# Patient Record
Sex: MALE | Race: WHITE | Employment: OTHER | URBAN - NONMETROPOLITAN AREA
[De-identification: names, ages, dates, MRNs, and addresses within clinical notes are randomized per-mention and may not be internally consistent; named-entity substitution may affect disease eponyms.]

---

## 2017-02-28 ENCOUNTER — OFFICE VISIT (OUTPATIENT)
Dept: CARDIOLOGY CLINIC | Age: 62
End: 2017-02-28

## 2017-02-28 VITALS
BODY MASS INDEX: 36.98 KG/M2 | SYSTOLIC BLOOD PRESSURE: 124 MMHG | WEIGHT: 244 LBS | HEIGHT: 68 IN | HEART RATE: 72 BPM | DIASTOLIC BLOOD PRESSURE: 60 MMHG

## 2017-02-28 DIAGNOSIS — I10 ESSENTIAL HYPERTENSION, BENIGN: ICD-10-CM

## 2017-02-28 DIAGNOSIS — I25.10 ATHEROSCLEROSIS OF NATIVE CORONARY ARTERY OF NATIVE HEART WITHOUT ANGINA PECTORIS: ICD-10-CM

## 2017-02-28 DIAGNOSIS — E78.5 HYPERLIPIDEMIA, UNSPECIFIED HYPERLIPIDEMIA TYPE: Primary | ICD-10-CM

## 2017-02-28 PROCEDURE — G8598 ASA/ANTIPLAT THER USED: HCPCS | Performed by: INTERNAL MEDICINE

## 2017-02-28 PROCEDURE — G8427 DOCREV CUR MEDS BY ELIG CLIN: HCPCS | Performed by: INTERNAL MEDICINE

## 2017-02-28 PROCEDURE — G8417 CALC BMI ABV UP PARAM F/U: HCPCS | Performed by: INTERNAL MEDICINE

## 2017-02-28 PROCEDURE — 1036F TOBACCO NON-USER: CPT | Performed by: INTERNAL MEDICINE

## 2017-02-28 PROCEDURE — 99214 OFFICE O/P EST MOD 30 MIN: CPT | Performed by: INTERNAL MEDICINE

## 2017-02-28 PROCEDURE — 3017F COLORECTAL CA SCREEN DOC REV: CPT | Performed by: INTERNAL MEDICINE

## 2017-02-28 PROCEDURE — G8484 FLU IMMUNIZE NO ADMIN: HCPCS | Performed by: INTERNAL MEDICINE

## 2017-02-28 RX ORDER — IRBESARTAN AND HYDROCHLOROTHIAZIDE 300; 12.5 MG/1; MG/1
1 TABLET, FILM COATED ORAL DAILY
Qty: 30 TABLET | Refills: 11 | Status: SHIPPED | OUTPATIENT
Start: 2017-02-28 | End: 2017-08-28 | Stop reason: SDUPTHER

## 2017-02-28 RX ORDER — AMLODIPINE BESYLATE 10 MG/1
10 TABLET ORAL DAILY
Qty: 30 TABLET | Refills: 11 | Status: SHIPPED | OUTPATIENT
Start: 2017-02-28 | End: 2017-08-28 | Stop reason: SDUPTHER

## 2017-05-03 ENCOUNTER — TELEPHONE (OUTPATIENT)
Dept: CARDIOLOGY CLINIC | Age: 62
End: 2017-05-03

## 2017-08-28 ENCOUNTER — OFFICE VISIT (OUTPATIENT)
Dept: CARDIOLOGY CLINIC | Age: 62
End: 2017-08-28

## 2017-08-28 VITALS
HEIGHT: 68 IN | HEART RATE: 61 BPM | DIASTOLIC BLOOD PRESSURE: 76 MMHG | WEIGHT: 243 LBS | BODY MASS INDEX: 36.83 KG/M2 | SYSTOLIC BLOOD PRESSURE: 139 MMHG

## 2017-08-28 DIAGNOSIS — N28.9 RENAL INSUFFICIENCY: ICD-10-CM

## 2017-08-28 DIAGNOSIS — E78.5 HYPERLIPIDEMIA, UNSPECIFIED HYPERLIPIDEMIA TYPE: ICD-10-CM

## 2017-08-28 DIAGNOSIS — I25.10 ATHEROSCLEROSIS OF NATIVE CORONARY ARTERY OF NATIVE HEART WITHOUT ANGINA PECTORIS: ICD-10-CM

## 2017-08-28 DIAGNOSIS — E11.9 TYPE 2 DIABETES MELLITUS WITHOUT COMPLICATION, UNSPECIFIED LONG TERM INSULIN USE STATUS: ICD-10-CM

## 2017-08-28 DIAGNOSIS — I10 ESSENTIAL HYPERTENSION, BENIGN: ICD-10-CM

## 2017-08-28 DIAGNOSIS — I25.10 CORONARY ARTERY DISEASE INVOLVING NATIVE HEART WITHOUT ANGINA PECTORIS, UNSPECIFIED VESSEL OR LESION TYPE: Primary | ICD-10-CM

## 2017-08-28 PROCEDURE — 99213 OFFICE O/P EST LOW 20 MIN: CPT | Performed by: INTERNAL MEDICINE

## 2017-08-28 PROCEDURE — 1036F TOBACCO NON-USER: CPT | Performed by: INTERNAL MEDICINE

## 2017-08-28 PROCEDURE — 3046F HEMOGLOBIN A1C LEVEL >9.0%: CPT | Performed by: INTERNAL MEDICINE

## 2017-08-28 PROCEDURE — 3017F COLORECTAL CA SCREEN DOC REV: CPT | Performed by: INTERNAL MEDICINE

## 2017-08-28 PROCEDURE — G8427 DOCREV CUR MEDS BY ELIG CLIN: HCPCS | Performed by: INTERNAL MEDICINE

## 2017-08-28 PROCEDURE — G8598 ASA/ANTIPLAT THER USED: HCPCS | Performed by: INTERNAL MEDICINE

## 2017-08-28 PROCEDURE — G8417 CALC BMI ABV UP PARAM F/U: HCPCS | Performed by: INTERNAL MEDICINE

## 2017-08-28 RX ORDER — NITROGLYCERIN 0.4 MG/1
0.4 TABLET SUBLINGUAL EVERY 5 MIN PRN
Qty: 25 TABLET | Refills: 1 | Status: SHIPPED | OUTPATIENT
Start: 2017-08-28 | End: 2018-09-06 | Stop reason: SDUPTHER

## 2017-08-28 RX ORDER — IRBESARTAN AND HYDROCHLOROTHIAZIDE 300; 12.5 MG/1; MG/1
1 TABLET, FILM COATED ORAL DAILY
Qty: 30 TABLET | Refills: 11 | Status: SHIPPED | OUTPATIENT
Start: 2017-08-28 | End: 2018-08-31 | Stop reason: SDUPTHER

## 2017-08-28 RX ORDER — AMLODIPINE BESYLATE 10 MG/1
10 TABLET ORAL DAILY
Qty: 30 TABLET | Refills: 11 | Status: SHIPPED | OUTPATIENT
Start: 2017-08-28 | End: 2018-08-31 | Stop reason: SDUPTHER

## 2017-09-07 ENCOUNTER — HOSPITAL ENCOUNTER (OUTPATIENT)
Dept: NON INVASIVE DIAGNOSTICS | Age: 62
Discharge: HOME | End: 2017-09-07
Attending: INTERNAL MEDICINE | Admitting: INTERNAL MEDICINE

## 2017-09-07 DIAGNOSIS — I25.10 ATHEROSCLEROTIC HEART DISEASE OF NATIVE CORONARY ARTERY WITHOUT ANGINA PECTORIS: ICD-10-CM

## 2018-08-14 ENCOUNTER — TELEPHONE (OUTPATIENT)
Dept: CARDIOLOGY CLINIC | Age: 63
End: 2018-08-14

## 2018-08-14 DIAGNOSIS — N18.9 CHRONIC KIDNEY DISEASE, UNSPECIFIED CKD STAGE: Primary | ICD-10-CM

## 2018-08-14 DIAGNOSIS — E11.8 TYPE 2 DIABETES MELLITUS WITH COMPLICATION, WITHOUT LONG-TERM CURRENT USE OF INSULIN (HCC): ICD-10-CM

## 2018-08-14 NOTE — TELEPHONE ENCOUNTER
This patient just called called Dr Amador Adams office to make an appt . He was supposed to call them after his appt 8/28/17 and come back to see LES in 2/2018 . Does [facility]ES still want this patient to see Dr Rosa Isela Hernandez? They need an updated referral if he does want him to see Dr Rosa Isela Hernandez .  Please call Dennie Haus at Dr Amador Adams office to let her know if [facility] wants to see the patient first or if he is going to fax a referral . Fax [phone]

## 2018-08-14 NOTE — TELEPHONE ENCOUNTER
Referral placed in [facility] and faxed to Dr. Esqueda Mar office. Also called Madison Valerio to let her know referral was sent and patient can be seen by Dr. Kelly Elena before being seen by Dr. Candelaria Rodriguez.

## 2018-08-31 RX ORDER — AMLODIPINE BESYLATE 10 MG/1
TABLET ORAL
Qty: 30 TABLET | Refills: 0 | Status: SHIPPED | OUTPATIENT
Start: 2018-08-31 | End: 2018-09-10 | Stop reason: SDUPTHER

## 2018-08-31 RX ORDER — IRBESARTAN AND HYDROCHLOROTHIAZIDE 300; 12.5 MG/1; MG/1
TABLET, FILM COATED ORAL
Qty: 30 TABLET | Refills: 0 | Status: SHIPPED | OUTPATIENT
Start: 2018-08-31 | End: 2018-09-12 | Stop reason: SDUPTHER

## 2018-09-06 ENCOUNTER — OFFICE VISIT (OUTPATIENT)
Dept: CARDIOLOGY CLINIC | Age: 63
End: 2018-09-06

## 2018-09-06 VITALS
DIASTOLIC BLOOD PRESSURE: 72 MMHG | HEIGHT: 69 IN | SYSTOLIC BLOOD PRESSURE: 116 MMHG | HEART RATE: 64 BPM | BODY MASS INDEX: 36.05 KG/M2 | WEIGHT: 243.38 LBS

## 2018-09-06 DIAGNOSIS — E78.5 HYPERLIPIDEMIA, UNSPECIFIED HYPERLIPIDEMIA TYPE: ICD-10-CM

## 2018-09-06 DIAGNOSIS — I10 ESSENTIAL HYPERTENSION, BENIGN: ICD-10-CM

## 2018-09-06 DIAGNOSIS — I25.10 ATHEROSCLEROSIS OF NATIVE CORONARY ARTERY OF NATIVE HEART WITHOUT ANGINA PECTORIS: Primary | ICD-10-CM

## 2018-09-06 DIAGNOSIS — N28.9 RENAL INSUFFICIENCY: ICD-10-CM

## 2018-09-06 PROCEDURE — 3017F COLORECTAL CA SCREEN DOC REV: CPT | Performed by: INTERNAL MEDICINE

## 2018-09-06 PROCEDURE — G8598 ASA/ANTIPLAT THER USED: HCPCS | Performed by: INTERNAL MEDICINE

## 2018-09-06 PROCEDURE — G8417 CALC BMI ABV UP PARAM F/U: HCPCS | Performed by: INTERNAL MEDICINE

## 2018-09-06 PROCEDURE — 1036F TOBACCO NON-USER: CPT | Performed by: INTERNAL MEDICINE

## 2018-09-06 PROCEDURE — G8427 DOCREV CUR MEDS BY ELIG CLIN: HCPCS | Performed by: INTERNAL MEDICINE

## 2018-09-06 PROCEDURE — 99214 OFFICE O/P EST MOD 30 MIN: CPT | Performed by: INTERNAL MEDICINE

## 2018-09-06 RX ORDER — NITROGLYCERIN 0.4 MG/1
0.4 TABLET SUBLINGUAL EVERY 5 MIN PRN
Qty: 25 TABLET | Refills: 1 | Status: SHIPPED | OUTPATIENT
Start: 2018-09-06 | End: 2021-03-12 | Stop reason: SDUPTHER

## 2018-09-06 NOTE — COMMUNICATION BODY
[address]   Cardiac Follow Up    Referring Provider:  Christian Spencer MD     Chief Complaint   Patient presents with    6 Month Follow-Up     Pt states he has no complaints. History of Present Illness:  Mr. Joseph Odom is a 61y.o. year old gentleman here today for follow up. His history includes non-obstructive CAD (40-50% mid LAD lesion per cath 9/2009), hypertension, hyperlipidemia, and diabetes. He is active, does not smoke. Today, Daniel Franco tells me both he and his wife are fully retired and enjoying every bit of it. Has recently been to [place] with his horses. He denies exertional chest pain, SOB/STEWARD, PND, palpitations, light-headedness, or edema. Seeing Dr Chelita Alvarado for renal insufficiency      Past Medical History:   has a past medical history of Diabetes mellitus ([facility] [address] Hyperlipidemia; and Hypertension. Surgical History:   has no past surgical history on file. Social History:   reports that he has never smoked. He has never used smokeless tobacco. He reports that he drinks alcohol. He reports that he does not use drugs. Family History:  family history includes Heart Disease in his father. Current Outpatient Prescriptions   Medication Sig Dispense Refill    amLODIPine (NORVASC) 10 MG tablet TAKE ONE TABLET BY MOUTH EVERY DAY 30 tablet 0    irbesartan-hydrochlorothiazide (AVALIDE) 300-12.5 MG per tablet TAKE ONE TABLET BY MOUTH EVERY DAY 30 tablet 0    nitroGLYCERIN (NITROSTAT) 0.4 MG SL tablet Place 1 tablet under the tongue every 5 minutes as needed for Chest pain As directed for chest pain 25 tablet 1    empagliflozin (JARDIANCE) 25 MG tablet Take 25 mg by mouth daily      metformin (GLUCOPHAGE) 500 MG tablet Take 1 tablet by mouth 3 times daily. 90 tablet 6    atorvastatin (LIPITOR) 20 MG tablet Take 20 mg by mouth daily.  glyBURIDE (DIABETA) 5 MG tablet Take 5 mg by mouth 2 times daily.         ASA-APAP-Caff-Ca Gluc TABS Take 81 mg by mouth daily.  metoprolol (TOPROL XL) 100 MG XL tablet Take 100 mg by mouth daily.  ramipril (ALTACE) 10 MG tablet Take 10 mg by mouth 2 times daily. No current facility-administered medications for this visit. Allergies:  Fish oil     Review of Systems:   · Constitutional: there has been no unanticipated weight loss. There's been no change in energy level, sleep pattern, or activity level. · Eyes: No visual changes or diplopia. No scleral icterus. · ENT: No Headaches, hearing loss or vertigo. No mouth sores or sore throat. · Cardiovascular: Reviewed in HPI  · Respiratory: No cough or wheezing, no sputum production. No hematemesis. · Gastrointestinal: No abdominal pain, appetite loss, blood in stools. No change in bowel or bladder habits. · Genitourinary: No dysuria, trouble voiding, or hematuria. · Musculoskeletal:  No gait disturbance, weakness or joint complaints. · Integumentary: No rash or pruritis. · Neurological: No headache, diplopia, change in muscle strength, numbness or tingling. No change in gait, balance, coordination, mood, affect, memory, mentation, behavior. · Psychiatric: No anxiety, no depression. · Endocrine: No malaise, fatigue or temperature intolerance. No excessive thirst, fluid intake, or urination. No tremor. · Hematologic/Lymphatic: No abnormal bruising or bleeding, blood clots or swollen lymph nodes. · Allergic/Immunologic: No nasal congestion or hives.     Physical Examination:    Vitals:    09/06/18 0921   BP: 116/72   Pulse: 64        Constitutional and General Appearance: NAD, obese, pleasant  Skin:good turgor,intact without lesions  HEENT: EOMI ,normal  Neck:no JVD   Respiratory:  · Normal excursion and expansion without use of accessory muscles  · Resp Auscultation: Normal breath sounds without dullness  Cardiovascular:  · The apical impulses not displaced  · Heart tones are crisp and normal  · Cervical veins are not engorged  · The carotid upstroke is normal in amplitude and contour without delay or bruit  · Normal heart tones with no rub, murmur or gallops  · Peripheral pulses are symmetrical and full  · There is no clubbing, cyanosis of the extremities. · No edema  · Femoral Arteries: 2+ and equal  · Pedal Pulses: 2+ and equal   Abdomen:  · No masses or tenderness  · Liver/Spleen: No Abnormalities Noted  Neurological/Psychiatric:  · Alert and oriented in all spheres  · Moves all extremities well  · Exhibits normal gait balance and coordination  · No abnormalities of mood, affect, memory, mentation, or behavior are noted      Assessment/Plan:    1. Coronary artery Disease: Cath 9/2009:  40-50% mid LAD lesion. Stress test 8/2010 showed normal perfusion and EF. GXT Myoview 7/2012: normal with hypertensive exercise response     2. Diabetes mellitus: Followed by PCP   3. Essential hypertension, benign: /72 (Site: Left Arm, Position: Sitting, Cuff Size: Large Adult)   Pulse 64   Ht 5' 9\" (1.753 m)   Wt 243 lb 6 oz (110.4 kg)   BMI 35.94 kg/m²     4. Other and unspecified hyperlipidemia:  4/21/17  Bun 31 creat 1.9 tc 137 tri 156 hdl 35 ldl 71--stable   5. Altered kidney function - labs 5/16: creat 2.24.4/17  Creat 1.9  . Recently 2.0 and [facility]    Plan  1. Will have lipids, AST and ALT drawn  2. No med changes at this time  3. Call for any change in status  4. Follow up in 6 months- recommend gxt every 2-3 years      I appreciate the opportunity of cooperating in the care of this individual.    Ashutosh Iqbal M.D., [facility]    This note was scribed in the presence of Dr. Danielle Iqbal MD, by Felicia Pacheco RN. The scribe's documentation has been prepared under my direction and personally reviewed by me in its entirety. I confirm that the note above accurately reflects all work, treatment, procedures, and medical decision making performed by me.

## 2018-09-06 NOTE — LETTER
tongue every 5 minutes as needed for Chest pain As directed for chest pain 25 tablet 1    empagliflozin (JARDIANCE) 25 MG tablet Take 25 mg by mouth daily      metformin (GLUCOPHAGE) 500 MG tablet Take 1 tablet by mouth 3 times daily. 90 tablet 6    atorvastatin (LIPITOR) 20 MG tablet Take 20 mg by mouth daily.  glyBURIDE (DIABETA) 5 MG tablet Take 5 mg by mouth 2 times daily.  ASA-APAP-Caff-Ca Gluc TABS Take 81 mg by mouth daily.  metoprolol (TOPROL XL) 100 MG XL tablet Take 100 mg by mouth daily.  ramipril (ALTACE) 10 MG tablet Take 10 mg by mouth 2 times daily. No current facility-administered medications for this visit. Allergies:  Fish oil     Review of Systems:   · Constitutional: there has been no unanticipated weight loss. There's been no change in energy level, sleep pattern, or activity level. · Eyes: No visual changes or diplopia. No scleral icterus. · ENT: No Headaches, hearing loss or vertigo. No mouth sores or sore throat. · Cardiovascular: Reviewed in HPI  · Respiratory: No cough or wheezing, no sputum production. No hematemesis. · Gastrointestinal: No abdominal pain, appetite loss, blood in stools. No change in bowel or bladder habits. · Genitourinary: No dysuria, trouble voiding, or hematuria. · Musculoskeletal:  No gait disturbance, weakness or joint complaints. · Integumentary: No rash or pruritis. · Neurological: No headache, diplopia, change in muscle strength, numbness or tingling. No change in gait, balance, coordination, mood, affect, memory, mentation, behavior. · Psychiatric: No anxiety, no depression. · Endocrine: No malaise, fatigue or temperature intolerance. No excessive thirst, fluid intake, or urination. No tremor. · Hematologic/Lymphatic: No abnormal bruising or bleeding, blood clots or swollen lymph nodes. · Allergic/Immunologic: No nasal congestion or hives.     Physical Examination:    Vitals:    09/06/18 8407

## 2018-09-06 NOTE — PROGRESS NOTES
[address]   Cardiac Follow Up    Referring Provider:  Santy Perdomo MD     Chief Complaint   Patient presents with    6 Month Follow-Up     Pt states he has no complaints. History of Present Illness:  Mr. Jessica Saleh is a 61y.o. year old gentleman here today for follow up. His history includes non-obstructive CAD (40-50% mid LAD lesion per cath 9/2009), hypertension, hyperlipidemia, and diabetes. He is active, does not smoke. Today, Erasto Ibarra tells me both he and his wife are fully retired and enjoying every bit of it. Has recently been to [place] with his horses. He denies exertional chest pain, SOB/STEWARD, PND, palpitations, light-headedness, or edema. Seeing Dr Kelly Elena for renal insufficiency      Past Medical History:   has a past medical history of Diabetes mellitus ([address] Hyperlipidemia; and Hypertension. Surgical History:   has no past surgical history on file. Social History:   reports that he has never smoked. He has never used smokeless tobacco. He reports that he drinks alcohol. He reports that he does not use drugs. Family History:  family history includes Heart Disease in his father. Current Outpatient Prescriptions   Medication Sig Dispense Refill    amLODIPine (NORVASC) 10 MG tablet TAKE ONE TABLET BY MOUTH EVERY DAY 30 tablet 0    irbesartan-hydrochlorothiazide (AVALIDE) 300-12.5 MG per tablet TAKE ONE TABLET BY MOUTH EVERY DAY 30 tablet 0    nitroGLYCERIN (NITROSTAT) 0.4 MG SL tablet Place 1 tablet under the tongue every 5 minutes as needed for Chest pain As directed for chest pain 25 tablet 1    empagliflozin (JARDIANCE) 25 MG tablet Take 25 mg by mouth daily      metformin (GLUCOPHAGE) 500 MG tablet Take 1 tablet by mouth 3 times daily. 90 tablet 6    atorvastatin (LIPITOR) 20 MG tablet Take 20 mg by mouth daily.  glyBURIDE (DIABETA) 5 MG tablet Take 5 mg by mouth 2 times daily.         ASA-APAP-Caff-Ca Gluc TABS Take 81 mg by upstroke is normal in amplitude and contour without delay or bruit  · Normal heart tones with no rub, murmur or gallops  · Peripheral pulses are symmetrical and full  · There is no clubbing, cyanosis of the extremities. · No edema  · Femoral Arteries: 2+ and equal  · Pedal Pulses: 2+ and equal   Abdomen:  · No masses or tenderness  · Liver/Spleen: No Abnormalities Noted  Neurological/Psychiatric:  · Alert and oriented in all spheres  · Moves all extremities well  · Exhibits normal gait balance and coordination  · No abnormalities of mood, affect, memory, mentation, or behavior are noted      Assessment/Plan:    1. Coronary artery Disease: Cath 9/2009:  40-50% mid LAD lesion. Stress test 8/2010 showed normal perfusion and EF. GXT Myoview 7/2012: normal with hypertensive exercise response     2. Diabetes mellitus: Followed by PCP   3. Essential hypertension, benign: /72 (Site: Left Arm, Position: Sitting, Cuff Size: Large Adult)   Pulse 64   Ht 5' 9\" (1.753 m)   Wt 243 lb 6 oz (110.4 kg)   BMI 35.94 kg/m²    4. Other and unspecified hyperlipidemia:  4/21/17  Bun 31 creat 1.9 tc 137 tri 156 hdl 35 ldl 71--stable   5. Altered kidney function - labs 5/16: creat 2.24.4/17  Creat 1.9  . Recently 2.0 and has upcoming [facility] visit 9/2018    Plan  1. Will have lipids, AST and ALT drawn  2. No med changes at this time  3. Call for any change in status  4. Follow up in 6 months- recommend gxt every 2-3 years      I appreciate the opportunity of cooperating in the care of this individual.    Valentine Delacruz M.D., [facility] [place]    This note was scribed in the presence of Dr. Arthur Delacruz MD, by Ronni Kocher, RN. The scribe's documentation has been prepared under my direction and personally reviewed by me in its entirety. I confirm that the note above accurately reflects all work, treatment, procedures, and medical decision making performed by me.

## 2018-09-10 ENCOUNTER — TELEPHONE (OUTPATIENT)
Dept: CARDIOLOGY CLINIC | Age: 63
End: 2018-09-10

## 2018-09-10 RX ORDER — AMLODIPINE BESYLATE 10 MG/1
10 TABLET ORAL DAILY
Qty: 30 TABLET | Refills: 11 | Status: SHIPPED | OUTPATIENT
Start: 2018-09-10 | End: 2019-07-23

## 2018-09-13 RX ORDER — IRBESARTAN AND HYDROCHLOROTHIAZIDE 300; 12.5 MG/1; MG/1
TABLET, FILM COATED ORAL
Qty: 30 TABLET | Refills: 11 | Status: SHIPPED | OUTPATIENT
Start: 2018-09-13 | End: 2020-03-06 | Stop reason: SDUPTHER

## 2018-12-05 ENCOUNTER — TELEPHONE (OUTPATIENT)
Dept: CARDIOLOGY CLINIC | Age: 63
End: 2018-12-05

## 2019-01-02 ENCOUNTER — TELEPHONE (OUTPATIENT)
Dept: CARDIOLOGY CLINIC | Age: 64
End: 2019-01-02

## 2019-07-19 NOTE — PROGRESS NOTES
[facility]   Cardiac Follow Up    Referring Provider:  Sumanth Rush DO     Chief Complaint   Patient presents with    6 Month Follow-Up    Coronary Artery Disease    Hypertension    Dizziness      History of Present Illness:  Mr. Aide Valles is a 59y.o. year old gentleman with a history of non-obstructive CAD (40-50% mid LAD lesion per cath 9/2009), hypertension, hyperlipidemia, and diabetes. He follows with Dr. Claire De La Rosa for renal insufficiency. Today, he is here for regular follow up. He's been having dizzy spells when working outside; his wife checks his B/P and has been running in the 34'O systolic. He cut the Altace in half and then started taking his ramipril at night instead of the morning; hehas had no more spells. He denies exertional chest pain, STEWARD/PND, palpitations, edema. He and his wife are fully retired and enjoying every bit of it. He stays busy caring for his horses. Past Medical History:   has a past medical history of Diabetes mellitus ([address] Hyperlipidemia, and Hypertension. Surgical History:   has no past surgical history on file. Social History:   reports that he has never smoked. He has never used smokeless tobacco. He reports that he drinks alcohol. He reports that he does not use drugs. Family History:  family history includes Heart Disease in his father. Current Outpatient Medications   Medication Sig Dispense Refill    irbesartan-hydrochlorothiazide (AVALIDE) 300-12.5 MG per tablet TAKE ONE TABLET BY MOUTH EVERY DAY 30 tablet 11    amLODIPine (NORVASC) 10 MG tablet Take 1 tablet by mouth daily 30 tablet 11    nitroGLYCERIN (NITROSTAT) 0.4 MG SL tablet Place 1 tablet under the tongue every 5 minutes as needed for Chest pain As directed for chest pain 25 tablet 1    empagliflozin (JARDIANCE) 25 MG tablet Take 25 mg by mouth daily      metformin (GLUCOPHAGE) 500 MG tablet Take 1 tablet by mouth 3 times daily.  90 tablet 6   

## 2019-07-23 ENCOUNTER — OFFICE VISIT (OUTPATIENT)
Dept: CARDIOLOGY CLINIC | Age: 64
End: 2019-07-23
Payer: COMMERCIAL

## 2019-07-23 VITALS
DIASTOLIC BLOOD PRESSURE: 55 MMHG | SYSTOLIC BLOOD PRESSURE: 102 MMHG | HEART RATE: 59 BPM | RESPIRATION RATE: 16 BRPM | HEIGHT: 69 IN | BODY MASS INDEX: 35.7 KG/M2 | WEIGHT: 241 LBS

## 2019-07-23 DIAGNOSIS — I25.10 CORONARY ARTERY DISEASE INVOLVING NATIVE CORONARY ARTERY OF NATIVE HEART WITHOUT ANGINA PECTORIS: Primary | ICD-10-CM

## 2019-07-23 DIAGNOSIS — I10 ESSENTIAL HYPERTENSION: ICD-10-CM

## 2019-07-23 DIAGNOSIS — E78.5 HYPERLIPIDEMIA, UNSPECIFIED HYPERLIPIDEMIA TYPE: ICD-10-CM

## 2019-07-23 PROCEDURE — 3017F COLORECTAL CA SCREEN DOC REV: CPT | Performed by: INTERNAL MEDICINE

## 2019-07-23 PROCEDURE — 99214 OFFICE O/P EST MOD 30 MIN: CPT | Performed by: INTERNAL MEDICINE

## 2019-07-23 PROCEDURE — G8598 ASA/ANTIPLAT THER USED: HCPCS | Performed by: INTERNAL MEDICINE

## 2019-07-23 PROCEDURE — G8427 DOCREV CUR MEDS BY ELIG CLIN: HCPCS | Performed by: INTERNAL MEDICINE

## 2019-07-23 PROCEDURE — 1036F TOBACCO NON-USER: CPT | Performed by: INTERNAL MEDICINE

## 2019-07-23 PROCEDURE — 93000 ELECTROCARDIOGRAM COMPLETE: CPT | Performed by: INTERNAL MEDICINE

## 2019-07-23 PROCEDURE — G8417 CALC BMI ABV UP PARAM F/U: HCPCS | Performed by: INTERNAL MEDICINE

## 2019-07-23 RX ORDER — AMLODIPINE BESYLATE 5 MG/1
5 TABLET ORAL DAILY
Qty: 90 TABLET | Refills: 3 | Status: SHIPPED | OUTPATIENT
Start: 2019-07-23 | End: 2020-03-06 | Stop reason: SDUPTHER

## 2019-08-16 NOTE — PROGRESS NOTES
[address]   Cardiac Follow Up    Referring Provider:  Susanna Charlton DO     Chief Complaint   Patient presents with    Coronary Artery Disease    Hypertension    Hyperlipidemia      History of Present Illness:  Mr. Zackery Posadas is a 59y.o. year old gentleman with a history of non-obstructive CAD (40-50% mid LAD lesion per cath 9/2009), hypertension, hyperlipidemia, and diabetes. Today, he reports he has felt much better since the reduction in his BP medications. He is enjoying snf. Denies exertional chest pain, STEWARD/PND, palpitations, light-headedness, edema. His wife is with him for the visit. Past Medical History:   has a past medical history of Diabetes mellitus ([address] Hyperlipidemia, and Hypertension. Surgical History:   has no past surgical history on file. Social History:   reports that he has never smoked. He has never used smokeless tobacco. He reports that he drinks alcohol. He reports that he does not use drugs. Family History:  family history includes Heart Disease in his father. Current Outpatient Medications   Medication Sig Dispense Refill    amLODIPine (NORVASC) 5 MG tablet Take 1 tablet by mouth daily 90 tablet 3    irbesartan-hydrochlorothiazide (AVALIDE) 300-12.5 MG per tablet TAKE ONE TABLET BY MOUTH EVERY DAY 30 tablet 11    nitroGLYCERIN (NITROSTAT) 0.4 MG SL tablet Place 1 tablet under the tongue every 5 minutes as needed for Chest pain As directed for chest pain 25 tablet 1    empagliflozin (JARDIANCE) 25 MG tablet Take 25 mg by mouth daily      metformin (GLUCOPHAGE) 500 MG tablet Take 1 tablet by mouth 3 times daily. 90 tablet 6    atorvastatin (LIPITOR) 20 MG tablet Take 20 mg by mouth daily.  glyBURIDE (DIABETA) 5 MG tablet Take 5 mg by mouth 2 times daily.  ASA-APAP-Caff-Ca Gluc TABS Take 81 mg by mouth daily.  metoprolol (TOPROL XL) 100 MG XL tablet Take 100 mg by mouth daily.          No current full  · There is no clubbing, cyanosis of the extremities. · No edema  · Femoral Arteries: 2+ and equal  · Pedal Pulses: 2+ and equal   Abdomen:  · No masses or tenderness  · Liver/Spleen: No Abnormalities Noted  Neurological/Psychiatric:  · Alert and oriented in all spheres  · Moves all extremities well  · Exhibits normal gait balance and coordination  · No abnormalities of mood, affect, memory, mentation, or behavior are noted      Echo 8/23/19     Normal left ventricle size and systolic function with an estimated ejection   fraction of 60%. No regional wall motion abnormalities are seen.   There is moderate concentric left ventricular hypertrophy.   E/e\"= 13. Grade I diastolic dysfunction with normal LV filling pressures. [address] not well visualized. Assessment:  1. Coronary artery disease: Stable, no anginal symptoms  Cath 9/2009>  40-50% mid LAD lesion. Stress test 8/2010 showed normal perfusion and EF. GXT Myoview 7/2012> normal with hypertensive exercise response  EKG  7/23/19 (read & interpreted by me)> SR 61   2. Other and unspecified hyperlipidemia: Stable on Lipitor 20mg. 1/18/19> , , HDL 36, LDL 84.   3. Essential hypertension, benign: controlled. /72 (Site: Right Upper Arm, Position: Sitting, Cuff Size: Large Adult)   Pulse 56   Ht 5' 9\" (1.753 m)   Wt 239 lb (108.4 kg)   SpO2 97%   BMI 35.29 kg/m²    Stoped Altace, reduced amlodipine to 5mg at last visit. 4. Diabetes mellitus: Followed by PCP   5. Altered kidney function: 2/24.4/17>  Creat 1.9. 1/18/19> Creat 2.21. Recommended Follow up with Dr. Emmanuel Webber. Plan:  Echo results reviewed by me in office today  and discussed. Devorah Pena has a stable cardiac status. Lab results reviewed this visit and discussed. No med changes. Recommended follow up Dr. Emmanuel Webber for renal insufficiency. Continue risk factor modifications. Call for any change in symptoms.   Return for regular follow up in 6

## 2019-08-23 ENCOUNTER — OFFICE VISIT (OUTPATIENT)
Dept: CARDIOLOGY CLINIC | Age: 64
End: 2019-08-23
Payer: COMMERCIAL

## 2019-08-23 ENCOUNTER — PROCEDURE VISIT (OUTPATIENT)
Dept: CARDIOLOGY CLINIC | Age: 64
End: 2019-08-23
Payer: COMMERCIAL

## 2019-08-23 VITALS
OXYGEN SATURATION: 97 % | WEIGHT: 239 LBS | HEIGHT: 69 IN | SYSTOLIC BLOOD PRESSURE: 128 MMHG | BODY MASS INDEX: 35.4 KG/M2 | HEART RATE: 56 BPM | DIASTOLIC BLOOD PRESSURE: 72 MMHG

## 2019-08-23 DIAGNOSIS — I25.10 ATHEROSCLEROSIS OF NATIVE CORONARY ARTERY OF NATIVE HEART WITHOUT ANGINA PECTORIS: Primary | ICD-10-CM

## 2019-08-23 DIAGNOSIS — I10 ESSENTIAL HYPERTENSION, BENIGN: ICD-10-CM

## 2019-08-23 DIAGNOSIS — I10 ESSENTIAL HYPERTENSION: ICD-10-CM

## 2019-08-23 DIAGNOSIS — E78.5 HYPERLIPIDEMIA, UNSPECIFIED HYPERLIPIDEMIA TYPE: ICD-10-CM

## 2019-08-23 DIAGNOSIS — I25.10 CORONARY ARTERY DISEASE INVOLVING NATIVE CORONARY ARTERY OF NATIVE HEART WITHOUT ANGINA PECTORIS: ICD-10-CM

## 2019-08-23 DIAGNOSIS — N28.9 RENAL INSUFFICIENCY: ICD-10-CM

## 2019-08-23 DIAGNOSIS — E11.9 TYPE 2 DIABETES MELLITUS WITHOUT COMPLICATION, UNSPECIFIED WHETHER LONG TERM INSULIN USE (HCC): ICD-10-CM

## 2019-08-23 LAB
LV EF: 60 %
LVEF MODALITY: NORMAL

## 2019-08-23 PROCEDURE — 2022F DILAT RTA XM EVC RTNOPTHY: CPT | Performed by: INTERNAL MEDICINE

## 2019-08-23 PROCEDURE — G8417 CALC BMI ABV UP PARAM F/U: HCPCS | Performed by: INTERNAL MEDICINE

## 2019-08-23 PROCEDURE — 93306 TTE W/DOPPLER COMPLETE: CPT | Performed by: INTERNAL MEDICINE

## 2019-08-23 PROCEDURE — G8598 ASA/ANTIPLAT THER USED: HCPCS | Performed by: INTERNAL MEDICINE

## 2019-08-23 PROCEDURE — 3017F COLORECTAL CA SCREEN DOC REV: CPT | Performed by: INTERNAL MEDICINE

## 2019-08-23 PROCEDURE — G8427 DOCREV CUR MEDS BY ELIG CLIN: HCPCS | Performed by: INTERNAL MEDICINE

## 2019-08-23 PROCEDURE — 99213 OFFICE O/P EST LOW 20 MIN: CPT | Performed by: INTERNAL MEDICINE

## 2019-08-23 PROCEDURE — 1036F TOBACCO NON-USER: CPT | Performed by: INTERNAL MEDICINE

## 2019-08-23 PROCEDURE — 3046F HEMOGLOBIN A1C LEVEL >9.0%: CPT | Performed by: INTERNAL MEDICINE

## 2020-02-27 NOTE — PROGRESS NOTES
facility-administered medications for this visit. Allergies:  Fish oil     Review of Systems:   · Constitutional: there has been no unanticipated weight loss. There's been no change in energy level, sleep pattern, or activity level. · Eyes: No visual changes or diplopia. No scleral icterus. · ENT: No Headaches, hearing loss or vertigo. No mouth sores or sore throat. · Cardiovascular: Reviewed in HPI  · Respiratory: No cough or wheezing, no sputum production. No hematemesis. · Gastrointestinal: No abdominal pain, appetite loss, blood in stools. No change in bowel or bladder habits. · Genitourinary: No dysuria, trouble voiding, or hematuria. · Musculoskeletal:  No gait disturbance, weakness or joint complaints. · Integumentary: No rash or pruritis. · Neurological: No headache, diplopia, change in muscle strength, numbness or tingling. No change in gait, balance, coordination, mood, affect, memory, mentation, behavior. · Psychiatric: No anxiety, no depression. · Endocrine: No malaise, fatigue or temperature intolerance. No excessive thirst, fluid intake, or urination. No tremor. · Hematologic/Lymphatic: No abnormal bruising or bleeding, blood clots or swollen lymph nodes. · Allergic/Immunologic: No nasal congestion or hives.     Physical Examination:    Vitals:    03/06/20 1106   BP: 120/68   Pulse: 63   Resp: 18        Constitutional and General Appearance: NAD, obese, pleasant  Skin:good turgor,intact without lesions  HEENT: EOMI ,normal  Neck:no JVD   Respiratory:  · Normal excursion and expansion without use of accessory muscles  · Resp Auscultation: Normal breath sounds without dullness  Cardiovascular:  · The apical impulses not displaced  · Heart tones are crisp and normal  · Cervical veins are not engorged  · The carotid upstroke is normal in amplitude and contour without delay or bruit  · Normal heart tones with no rub, murmur or gallops  · Peripheral pulses are symmetrical and symptoms. Return for regular follow up in 6 months. I appreciate the opportunity of cooperating in the care of this individual.    Jai Thomas. Maynor Sandhu M.D., [facility] [place]        Patient's problem list, medications, allergies, past medical, surgical, social and family histories were reviewed and updated as appropriate. Scribe's attestation: This note was scribed in the presence of Dr. Maynor Sandhu MD, by Jolie Morales RN. The scribe's documentation has been prepared under my direction and personally reviewed by me in its entirety. I confirm that the note above accurately reflects all work, treatment, procedures, and medical decision making performed by me.

## 2020-03-06 ENCOUNTER — OFFICE VISIT (OUTPATIENT)
Dept: CARDIOLOGY CLINIC | Age: 65
End: 2020-03-06
Payer: COMMERCIAL

## 2020-03-06 VITALS
SYSTOLIC BLOOD PRESSURE: 120 MMHG | WEIGHT: 242 LBS | HEART RATE: 63 BPM | RESPIRATION RATE: 18 BRPM | BODY MASS INDEX: 35.84 KG/M2 | DIASTOLIC BLOOD PRESSURE: 68 MMHG | HEIGHT: 69 IN

## 2020-03-06 PROCEDURE — G8484 FLU IMMUNIZE NO ADMIN: HCPCS | Performed by: INTERNAL MEDICINE

## 2020-03-06 PROCEDURE — 3046F HEMOGLOBIN A1C LEVEL >9.0%: CPT | Performed by: INTERNAL MEDICINE

## 2020-03-06 PROCEDURE — 2022F DILAT RTA XM EVC RTNOPTHY: CPT | Performed by: INTERNAL MEDICINE

## 2020-03-06 PROCEDURE — 1036F TOBACCO NON-USER: CPT | Performed by: INTERNAL MEDICINE

## 2020-03-06 PROCEDURE — G8417 CALC BMI ABV UP PARAM F/U: HCPCS | Performed by: INTERNAL MEDICINE

## 2020-03-06 PROCEDURE — G8427 DOCREV CUR MEDS BY ELIG CLIN: HCPCS | Performed by: INTERNAL MEDICINE

## 2020-03-06 PROCEDURE — 99214 OFFICE O/P EST MOD 30 MIN: CPT | Performed by: INTERNAL MEDICINE

## 2020-03-06 PROCEDURE — 3017F COLORECTAL CA SCREEN DOC REV: CPT | Performed by: INTERNAL MEDICINE

## 2020-03-06 RX ORDER — AMLODIPINE BESYLATE 5 MG/1
5 TABLET ORAL DAILY
Qty: 30 TABLET | Refills: 11 | Status: SHIPPED | OUTPATIENT
Start: 2020-03-06 | End: 2021-03-12 | Stop reason: SDUPTHER

## 2020-03-06 RX ORDER — LORATADINE 10 MG/1
10 CAPSULE, LIQUID FILLED ORAL DAILY
COMMUNITY

## 2020-03-06 RX ORDER — IRBESARTAN AND HYDROCHLOROTHIAZIDE 300; 12.5 MG/1; MG/1
TABLET, FILM COATED ORAL
Qty: 30 TABLET | Refills: 11 | Status: SHIPPED | OUTPATIENT
Start: 2020-03-06 | End: 2021-03-12 | Stop reason: SDUPTHER

## 2020-09-23 NOTE — PROGRESS NOTES
[address]   Cardiac Follow Up    Referring Provider:  Linda Coy DO     Chief Complaint   Patient presents with    Coronary Artery Disease    Hypertension    Hyperlipidemia      History of Present Illness:  Mr. Jenita Meckel is a 72y.o. year old gentleman with a history of non-obstructive CAD (40-50% mid LAD lesion per cath 9/2009), hypertension, hyperlipidemia, and diabetes. Today, he reports he feels ok, he has been attending horse shows with his wife. Denies exertional chest pain, STEWARD/PND, palpitations, light-headedness, edema. He his concerned about numbness, weakness and tingling in his hands, thinks it is possibly carpal tunnel and would like to see an ortho. Past Medical History:   has a past medical history of Diabetes mellitus ([address] Hyperlipidemia, and Hypertension. Surgical History:   has no past surgical history on file. Social History:   reports that he has never smoked. He has never used smokeless tobacco. He reports current alcohol use. He reports that he does not use drugs. Family History:  family history includes Heart Disease in his father. Current Outpatient Medications   Medication Sig Dispense Refill    TRULICITY 0.66 MS/7.1ZF SOPN INJECT 1 DOSE SUBCUTANEOUSLY ONCE A WEEK      loratadine (CLARITIN) 10 MG capsule Take 10 mg by mouth daily      amLODIPine (NORVASC) 5 MG tablet Take 1 tablet by mouth daily 30 tablet 11    irbesartan-hydrochlorothiazide (AVALIDE) 300-12.5 MG per tablet TAKE ONE TABLET BY MOUTH EVERY DAY 30 tablet 11    empagliflozin (JARDIANCE) 25 MG tablet Take 25 mg by mouth daily      metformin (GLUCOPHAGE) 500 MG tablet Take 1 tablet by mouth 3 times daily. 90 tablet 6    atorvastatin (LIPITOR) 20 MG tablet Take 20 mg by mouth daily.  glyBURIDE (DIABETA) 5 MG tablet Take 5 mg by mouth 2 times daily.  metoprolol (TOPROL XL) 100 MG XL tablet Take 100 mg by mouth daily.         nitroGLYCERIN (NITROSTAT) 0.4 MG SL tablet Place 1 tablet under the tongue every 5 minutes as needed for Chest pain As directed for chest pain 25 tablet 1    ASA-APAP-Caff-Ca Gluc TABS Take 81 mg by mouth daily. No current facility-administered medications for this visit. Allergies:  Fish oil     Review of Systems:   · Constitutional: there has been no unanticipated weight loss. There's been no change in energy level, sleep pattern, or activity level. · Eyes: No visual changes or diplopia. No scleral icterus. · ENT: No Headaches, hearing loss or vertigo. No mouth sores or sore throat. · Cardiovascular: Reviewed in HPI  · Respiratory: No cough or wheezing, no sputum production. No hematemesis. · Gastrointestinal: No abdominal pain, appetite loss, blood in stools. No change in bowel or bladder habits. · Genitourinary: No dysuria, trouble voiding, or hematuria. · Musculoskeletal:  No gait disturbance, weakness or joint complaints. · Integumentary: No rash or pruritis. · Neurological: No headache, diplopia, change in muscle strength, numbness or tingling. No change in gait, balance, coordination, mood, affect, memory, mentation, behavior. · Psychiatric: No anxiety, no depression. · Endocrine: No malaise, fatigue or temperature intolerance. No excessive thirst, fluid intake, or urination. No tremor. · Hematologic/Lymphatic: No abnormal bruising or bleeding, blood clots or swollen lymph nodes. · Allergic/Immunologic: No nasal congestion or hives.     Physical Examination:    Vitals:    09/24/20 0903   BP: 110/60   Pulse: 73   SpO2: 96%        Constitutional and General Appearance: NAD, obese, pleasant  Skin:good turgor,intact without lesions  HEENT: EOMI ,normal  Neck:no JVD   Respiratory:  · Normal excursion and expansion without use of accessory muscles  · Resp Auscultation: Normal breath sounds without dullness  Cardiovascular:  · The apical impulses not displaced  · Heart tones are crisp and normal  · Cervical veins are not engorged  · The carotid upstroke is normal in amplitude and contour without delay or bruit  · Normal heart tones with no rub, murmur or gallops  · Peripheral pulses are symmetrical and full  · There is no clubbing, cyanosis of the extremities. · No edema  · Femoral Arteries: 2+ and equal  · Pedal Pulses: 2+ and equal   Abdomen:  · No masses or tenderness  · Liver/Spleen: No Abnormalities Noted  Neurological/Psychiatric:  · Alert and oriented in all spheres  · Moves all extremities well  · Exhibits normal gait balance and coordination  · No abnormalities of mood, affect, memory, mentation, or behavior are noted      Echo 8/23/19     Normal left ventricle size and systolic function with an estimated ejection   fraction of 60%. No regional wall motion abnormalities are seen.   There is moderate concentric left ventricular hypertrophy.   E/e\"= 13. Grade I diastolic dysfunction with normal LV filling pressures. [address] not well visualized. Assessment:  1. Coronary artery disease: Stable, no anginal symptoms  Cath 9/2009>  40-50% mid LAD lesion. Stress test 8/2010 showed normal perfusion and EF. GXT Myoview 7/2012> normal with hypertensive exercise response  EKG  7/23/19 (read & interpreted by me)> SR 61   2. Other and unspecified hyperlipidemia:  on Lipitor 20mg   6/19/20> , , HDL 33, LDL 76.   3. Essential hypertension, benign: controlled. /60 (Site: Left Upper Arm, Position: Sitting, Cuff Size: Medium Adult)   Pulse 73   Ht 5' 9\" (1.753 m)   Wt 241 lb (109.3 kg)   SpO2 96%   BMI 35.59 kg/m²     4. Diabetes mellitus: Followed by PCP- uncontrolled  6/16/20> A1c 10. 6- recently started Trulicity    5. Altered kidney function: 2/24.4/17>  Creat 1.9. 1/18/19> Creat 2.21.  8/8/19> 2.12  6/16/20 Creat 2.19   Recommended Follow up with Dr. Natty Macias- previously unable to follow up due to insurance issues.      Hand numbness and pain which is bilateral. Suggested he see Dr Dary Chaparro or associates. Uncertain the cause     Plan:   Shaq Navarrete has a stable cardiac status. Lab results reviewed this visit and discussed. No med changes. Can see Billie Vasquez in this office for eval.   Recommended follow up Dr. Mary Zapata for renal insufficiency  Continue risk factor modifications. Call for any change in symptoms. Return for regular follow up in 6 months. I appreciate the opportunity of cooperating in the care of this individual.    Azar Reilly. Dave Carlson M.D., [address] [facility]    Patient's problem list, medications, allergies, past medical, surgical, social and family histories were reviewed and updated as appropriate. Scribe's attestation: This note was scribed in the presence of Dr. Dave Carlson MD, by Carlos Enrique Hartley RN. The scribe's documentation has been prepared under my direction and personally reviewed by me in its entirety. I confirm that the note above accurately reflects all work, treatment, procedures, and medical decision making performed by me.

## 2020-09-24 ENCOUNTER — OFFICE VISIT (OUTPATIENT)
Dept: CARDIOLOGY CLINIC | Age: 65
End: 2020-09-24
Payer: MEDICARE

## 2020-09-24 VITALS
DIASTOLIC BLOOD PRESSURE: 60 MMHG | HEIGHT: 69 IN | SYSTOLIC BLOOD PRESSURE: 110 MMHG | OXYGEN SATURATION: 96 % | BODY MASS INDEX: 35.7 KG/M2 | WEIGHT: 241 LBS | HEART RATE: 73 BPM

## 2020-09-24 PROCEDURE — 99214 OFFICE O/P EST MOD 30 MIN: CPT | Performed by: INTERNAL MEDICINE

## 2020-09-24 RX ORDER — DULAGLUTIDE 0.75 MG/.5ML
INJECTION, SOLUTION SUBCUTANEOUS
COMMUNITY
Start: 2020-07-29

## 2021-03-12 ENCOUNTER — OFFICE VISIT (OUTPATIENT)
Dept: CARDIOLOGY CLINIC | Age: 66
End: 2021-03-12
Payer: MEDICARE

## 2021-03-12 VITALS
SYSTOLIC BLOOD PRESSURE: 126 MMHG | RESPIRATION RATE: 20 BRPM | WEIGHT: 240 LBS | DIASTOLIC BLOOD PRESSURE: 62 MMHG | OXYGEN SATURATION: 98 % | HEIGHT: 69 IN | HEART RATE: 70 BPM | BODY MASS INDEX: 35.55 KG/M2

## 2021-03-12 DIAGNOSIS — N28.9 RENAL INSUFFICIENCY: ICD-10-CM

## 2021-03-12 DIAGNOSIS — I25.10 CORONARY ARTERY DISEASE INVOLVING NATIVE CORONARY ARTERY OF NATIVE HEART WITHOUT ANGINA PECTORIS: Primary | ICD-10-CM

## 2021-03-12 DIAGNOSIS — I10 ESSENTIAL HYPERTENSION: ICD-10-CM

## 2021-03-12 DIAGNOSIS — E11.9 TYPE 2 DIABETES MELLITUS WITHOUT COMPLICATION, UNSPECIFIED WHETHER LONG TERM INSULIN USE (HCC): ICD-10-CM

## 2021-03-12 DIAGNOSIS — E78.5 HYPERLIPIDEMIA, UNSPECIFIED HYPERLIPIDEMIA TYPE: ICD-10-CM

## 2021-03-12 PROCEDURE — 99214 OFFICE O/P EST MOD 30 MIN: CPT | Performed by: INTERNAL MEDICINE

## 2021-03-12 RX ORDER — METOPROLOL SUCCINATE 100 MG/1
100 TABLET, EXTENDED RELEASE ORAL DAILY
Qty: 90 TABLET | Refills: 3 | Status: SHIPPED | OUTPATIENT
Start: 2021-03-12

## 2021-03-12 RX ORDER — ATORVASTATIN CALCIUM 20 MG/1
20 TABLET, FILM COATED ORAL NIGHTLY
Qty: 90 TABLET | Refills: 3 | Status: SHIPPED | OUTPATIENT
Start: 2021-03-12

## 2021-03-12 RX ORDER — NITROGLYCERIN 0.4 MG/1
0.4 TABLET SUBLINGUAL EVERY 5 MIN PRN
Qty: 25 TABLET | Refills: 1 | Status: SHIPPED | OUTPATIENT
Start: 2021-03-12

## 2021-03-12 RX ORDER — AMLODIPINE BESYLATE 5 MG/1
5 TABLET ORAL DAILY
Qty: 90 TABLET | Refills: 3 | Status: SHIPPED | OUTPATIENT
Start: 2021-03-12 | End: 2022-02-16

## 2021-03-12 RX ORDER — IRBESARTAN AND HYDROCHLOROTHIAZIDE 300; 12.5 MG/1; MG/1
TABLET, FILM COATED ORAL
Qty: 90 TABLET | Refills: 3 | Status: SHIPPED | OUTPATIENT
Start: 2021-03-12 | End: 2022-02-16

## 2021-03-12 NOTE — PATIENT INSTRUCTIONS
Plan:   Plan Stress echo in 6 month with next visit. Refills sent   Cardiac test and lab results personally reviewed by me during this office visit and discussed with patient. No medication changes at this time. Continue risk factor modifications with heathy eating and moderate walking as exercise. Call for any change in symptoms such as chest pain or shortness of breath.

## 2021-09-02 NOTE — PROGRESS NOTES
[address]   Cardiac Follow Up    Referring Provider:  Larry Gomez DO     Chief Complaint   Patient presents with    6 Month Follow-Up    Coronary Artery Disease    Hypertension    Hyperlipidemia      History of Present Illness:  Mr. Yanet Garland is a 77y.o. year old gentleman with a history of non-obstructive CAD (40-50% mid LAD lesion per cath 9/2009), hypertension, hyperlipidemia, and diabetes. He had bilateral carpel tunnel release on 11/17/2020. Today he is here with his wife. He reports doing well. He is busy with horse shows. He just had his stress echo today before his office visit and tolerated it well. No concerns on stress today. He also farms in the fall. Past Medical History:   has a past medical history of Diabetes mellitus ([address] Hyperlipidemia, and Hypertension. Surgical History:   has a past surgical history that includes Carpal tunnel release (Bilateral, 11/2020). Social History:   reports that he has never smoked. He has never used smokeless tobacco. He reports current alcohol use. He reports that he does not use drugs. Family History:  family history includes Heart Disease in his father.         Current Outpatient Medications   Medication Sig Dispense Refill    tadalafil (CIALIS) 5 MG tablet TAKE 1 TABLET BY MOUTH ONCE DAILY      atorvastatin (LIPITOR) 20 MG tablet Take 1 tablet by mouth nightly 90 tablet 3    irbesartan-hydrochlorothiazide (AVALIDE) 300-12.5 MG per tablet TAKE ONE TABLET BY MOUTH EVERY DAY 90 tablet 3    amLODIPine (NORVASC) 5 MG tablet Take 1 tablet by mouth daily 90 tablet 3    nitroGLYCERIN (NITROSTAT) 0.4 MG SL tablet Place 1 tablet under the tongue every 5 minutes as needed for Chest pain As directed for chest pain 25 tablet 1    metoprolol succinate (TOPROL XL) 100 MG extended release tablet Take 1 tablet by mouth daily 90 tablet 3    TRULICITY 8.76 KX/0.7DS SOPN INJECT 1 DOSE SUBCUTANEOUSLY ONCE A WEEK      loratadine (CLARITIN) 10 MG capsule Take 10 mg by mouth daily      empagliflozin (JARDIANCE) 25 MG tablet Take 25 mg by mouth daily      metformin (GLUCOPHAGE) 500 MG tablet Take 1 tablet by mouth 3 times daily. 90 tablet 6    glyBURIDE (DIABETA) 5 MG tablet Take 5 mg by mouth 2 times daily.  ASA-APAP-Caff-Ca Gluc TABS Take 81 mg by mouth daily. No current facility-administered medications for this visit. Allergies:  Fish oil     Review of Systems:   · Constitutional: there has been no unanticipated weight loss. There's been no change in energy level, sleep pattern, or activity level. · Eyes: No visual changes or diplopia. No scleral icterus. · ENT: No Headaches, hearing loss or vertigo. No mouth sores or sore throat. · Cardiovascular: Reviewed in HPI  · Respiratory: No cough or wheezing, no sputum production. No hematemesis. · Gastrointestinal: No abdominal pain, appetite loss, blood in stools. No change in bowel or bladder habits. · Genitourinary: No dysuria, trouble voiding, or hematuria. · Musculoskeletal:  No gait disturbance, weakness or joint complaints. · Integumentary: No rash or pruritis. · Neurological: No headache, diplopia, change in muscle strength, numbness or tingling. No change in gait, balance, coordination, mood, affect, memory, mentation, behavior. · Psychiatric: No anxiety, no depression. · Endocrine: No malaise, fatigue or temperature intolerance. No excessive thirst, fluid intake, or urination. No tremor. · Hematologic/Lymphatic: No abnormal bruising or bleeding, blood clots or swollen lymph nodes. · Allergic/Immunologic: No nasal congestion or hives.     Physical Examination:    Vitals:    09/09/21 1011   BP: 130/64   Pulse: 72   SpO2: 98%        Constitutional and General Appearance: NAD, obese, pleasant  Skin:good turgor,intact without lesions  HEENT: EOMI ,normal  Neck:no JVD   Respiratory:  · Normal excursion and expansion without use of accessory muscles  · Resp Auscultation: Normal breath sounds without dullness  Cardiovascular:  · The apical impulses not displaced  · Heart tones are crisp and normal  · Cervical veins are not engorged  · The carotid upstroke is normal in amplitude and contour without delay or bruit  · Normal heart tones with no rub, murmur or gallops  · Peripheral pulses are symmetrical and full  · There is no clubbing, cyanosis of the extremities. · No edema  · Femoral Arteries: 2+ and equal  · Pedal Pulses: 2+ and equal   Abdomen:  · No masses or tenderness  · Liver/Spleen: No Abnormalities Noted  Neurological/Psychiatric:  · Alert and oriented in all spheres  · Moves all extremities well  · Exhibits normal gait balance and coordination  · No abnormalities of mood, affect, memory, mentation, or behavior are noted    Stress echo 9/9/21        Echo 8/23/19   Normal left ventricle size and systolic function with an estimated ejection   fraction of 60%. No regional wall motion abnormalities are seen.   There is moderate concentric left ventricular hypertrophy.   E/e\"= 13. Grade I diastolic dysfunction with normal LV filling pressures. [address] not well visualized. Assessment:      1. Coronary artery disease: Stable, no anginal symptoms  Cath 9/2009>  40-50% mid LAD lesion. Stress test 8/2010 showed normal perfusion and EF. GXT Myoview 7/2012> normal with hypertensive exercise response  Stress ECHO 9/9/21 -  unremarkable  EKG  7/23/19 (read & interpreted by me)> SR 61  ~ Denies chest pain   2. Other and unspecified hyperlipidemia:  on Lipitor 20mg   6/19/20> , , HDL 33, LDL 76.  ~2/25/21 , HDL 39  LDL 86  . Continues to work on diet.   ~ repeat in 2/2022    3. Essential hypertension, benign: controlled. /64 (Site: Left Upper Arm, Position: Sitting, Cuff Size: Large Adult)   Pulse 72   Ht 5' 9\" (1.753 m)   Wt 236 lb (107 kg)   SpO2 98%   BMI 34.85 kg/m²    ~Stable     4. Diabetes mellitus:  Followed by PCP- uncontrolled  6/16/20> A1c 10. 6- recently started Trulicity   ~Now 6.9 on 2/25/21     5. Altered kidney function: 2/24.4/17>  Creat 1.9. 1/18/19> Creat 2.21.  8/8/19> 2.12  6/16/20 Creat 2.19   ~Improved to 1.8 on 2/25/21 likely due to improvements in diabetes. Follows with nephrology. Plan:   Cardiac test and lab results personally reviewed by me during this office visit and discussed with patient Rosie Dhillon  No medication changes at this time. Re check lipids with next ov. Around Feb '22   Continue risk factor modifications with heathy eating and moderate walking as exercise. Call for any change in symptoms such as chest pain or shortness of breath. I appreciate the opportunity of cooperating in the care of this individual.    Nola Hawkins M.D., [facility]    Patient's problem list, medications, allergies, past medical, surgical, social and family histories were reviewed and updated as appropriate. Scribe's Attestation: This note was scribed in the presence of Dr. Amber Cosme MD by Iwona Bonner RN. 09/09/21     The scribe's documentation has been prepared under my direction and personally reviewed by me in its entirety. I confirm that the note above accurately reflects all work, treatment, procedures, and medical decision making performed by me.

## 2021-09-09 ENCOUNTER — PROCEDURE VISIT (OUTPATIENT)
Dept: CARDIOLOGY CLINIC | Age: 66
End: 2021-09-09
Payer: MEDICARE

## 2021-09-09 ENCOUNTER — OFFICE VISIT (OUTPATIENT)
Dept: CARDIOLOGY CLINIC | Age: 66
End: 2021-09-09
Payer: MEDICARE

## 2021-09-09 VITALS
OXYGEN SATURATION: 98 % | HEART RATE: 72 BPM | WEIGHT: 236 LBS | BODY MASS INDEX: 34.96 KG/M2 | SYSTOLIC BLOOD PRESSURE: 130 MMHG | DIASTOLIC BLOOD PRESSURE: 64 MMHG | HEIGHT: 69 IN

## 2021-09-09 DIAGNOSIS — E78.5 HYPERLIPIDEMIA, UNSPECIFIED HYPERLIPIDEMIA TYPE: ICD-10-CM

## 2021-09-09 DIAGNOSIS — E78.5 HYPERLIPIDEMIA, UNSPECIFIED HYPERLIPIDEMIA TYPE: Primary | ICD-10-CM

## 2021-09-09 DIAGNOSIS — I25.10 CORONARY ARTERY DISEASE INVOLVING NATIVE CORONARY ARTERY OF NATIVE HEART WITHOUT ANGINA PECTORIS: ICD-10-CM

## 2021-09-09 DIAGNOSIS — I10 ESSENTIAL HYPERTENSION: ICD-10-CM

## 2021-09-09 DIAGNOSIS — I10 ESSENTIAL HYPERTENSION, BENIGN: ICD-10-CM

## 2021-09-09 LAB
LV EF: 50 %
LVEF MODALITY: NORMAL

## 2021-09-09 PROCEDURE — 99214 OFFICE O/P EST MOD 30 MIN: CPT | Performed by: INTERNAL MEDICINE

## 2021-09-09 PROCEDURE — 93325 DOPPLER ECHO COLOR FLOW MAPG: CPT | Performed by: INTERNAL MEDICINE

## 2021-09-09 PROCEDURE — 93351 STRESS TTE COMPLETE: CPT | Performed by: INTERNAL MEDICINE

## 2021-09-09 PROCEDURE — 93320 DOPPLER ECHO COMPLETE: CPT | Performed by: INTERNAL MEDICINE

## 2021-09-09 RX ORDER — TADALAFIL 5 MG/1
TABLET ORAL
COMMUNITY
Start: 2021-08-02

## 2021-09-09 NOTE — PATIENT INSTRUCTIONS
Your stress echo looks good today   Re check your cholesterol around 2/2022 - order placed  Follow up 6 months

## 2021-10-14 NOTE — PROGRESS NOTES
[facility]   Cardiac Follow Up    Referring Provider:  Kvng Bonds DO     Chief Complaint   Patient presents with    6 Month Follow-Up    Coronary Artery Disease      History of Present Illness:  Mr. Saintclair Ard is a 72y.o. year old gentleman with a history of non-obstructive CAD (40-50% mid LAD lesion per cath 9/2009), hypertension, hyperlipidemia, and diabetes. He had bilateral carpel tunnel release on 11/17/2020. Today, he reports he feels ok. He continues to work on diet and states he started on trulicity weekly. He states its pretty easy. Labs results reviewed. He is getting a new sleep study. He feels his breathing is doing better. He is here with his wife. Past Medical History:   has a past medical history of Diabetes mellitus ([address] Hyperlipidemia, and Hypertension. Surgical History:   has a past surgical history that includes Carpal tunnel release (Bilateral, 11/2020). Social History:   reports that he has never smoked. He has never used smokeless tobacco. He reports current alcohol use. He reports that he does not use drugs. Family History:  family history includes Heart Disease in his father. Current Outpatient Medications   Medication Sig Dispense Refill    TRULICITY 1.12 TY/6.7JV SOPN INJECT 1 DOSE SUBCUTANEOUSLY ONCE A WEEK      loratadine (CLARITIN) 10 MG capsule Take 10 mg by mouth daily      amLODIPine (NORVASC) 5 MG tablet Take 1 tablet by mouth daily 30 tablet 11    irbesartan-hydrochlorothiazide (AVALIDE) 300-12.5 MG per tablet TAKE ONE TABLET BY MOUTH EVERY DAY 30 tablet 11    nitroGLYCERIN (NITROSTAT) 0.4 MG SL tablet Place 1 tablet under the tongue every 5 minutes as needed for Chest pain As directed for chest pain 25 tablet 1    empagliflozin (JARDIANCE) 25 MG tablet Take 25 mg by mouth daily      metformin (GLUCOPHAGE) 500 MG tablet Take 1 tablet by mouth 3 times daily.  90 tablet 6    atorvastatin (LIPITOR) 20 MG tablet This has been signed and faxed to the school. Parent of patient notified.    Take 20 mg by mouth daily.  glyBURIDE (DIABETA) 5 MG tablet Take 5 mg by mouth 2 times daily.  ASA-APAP-Caff-Ca Gluc TABS Take 81 mg by mouth daily.  metoprolol (TOPROL XL) 100 MG XL tablet Take 100 mg by mouth daily. No current facility-administered medications for this visit. Allergies:  Fish oil     Review of Systems:   · Constitutional: there has been no unanticipated weight loss. There's been no change in energy level, sleep pattern, or activity level. · Eyes: No visual changes or diplopia. No scleral icterus. · ENT: No Headaches, hearing loss or vertigo. No mouth sores or sore throat. · Cardiovascular: Reviewed in HPI  · Respiratory: No cough or wheezing, no sputum production. No hematemesis. · Gastrointestinal: No abdominal pain, appetite loss, blood in stools. No change in bowel or bladder habits. · Genitourinary: No dysuria, trouble voiding, or hematuria. · Musculoskeletal:  No gait disturbance, weakness or joint complaints. · Integumentary: No rash or pruritis. · Neurological: No headache, diplopia, change in muscle strength, numbness or tingling. No change in gait, balance, coordination, mood, affect, memory, mentation, behavior. · Psychiatric: No anxiety, no depression. · Endocrine: No malaise, fatigue or temperature intolerance. No excessive thirst, fluid intake, or urination. No tremor. · Hematologic/Lymphatic: No abnormal bruising or bleeding, blood clots or swollen lymph nodes. · Allergic/Immunologic: No nasal congestion or hives.     Physical Examination:    Vitals:    03/12/21 1107   BP: 126/62   Pulse: 70   Resp: 20   SpO2: 98%        Constitutional and General Appearance: NAD, obese, pleasant  Skin:good turgor,intact without lesions  HEENT: EOMI ,normal  Neck:no JVD   Respiratory:  · Normal excursion and expansion without use of accessory muscles  · Resp Auscultation: Normal breath sounds without dullness  Cardiovascular:  · The apical impulses not displaced  · Heart tones are crisp and normal  · Cervical veins are not engorged  · The carotid upstroke is normal in amplitude and contour without delay or bruit  · Normal heart tones with no rub, murmur or gallops  · Peripheral pulses are symmetrical and full  · There is no clubbing, cyanosis of the extremities. · No edema  · Femoral Arteries: 2+ and equal  · Pedal Pulses: 2+ and equal   Abdomen:  · No masses or tenderness  · Liver/Spleen: No Abnormalities Noted  Neurological/Psychiatric:  · Alert and oriented in all spheres  · Moves all extremities well  · Exhibits normal gait balance and coordination  · No abnormalities of mood, affect, memory, mentation, or behavior are noted      Echo 8/23/19     Normal left ventricle size and systolic function with an estimated ejection   fraction of 60%. No regional wall motion abnormalities are seen.   There is moderate concentric left ventricular hypertrophy.   E/e\"= 13. Grade I diastolic dysfunction with normal LV filling pressures. [address] not well visualized. Assessment:  1. Coronary artery disease involving native coronary artery of native heart without angina pectoris    2. Hyperlipidemia, unspecified hyperlipidemia type    3. Essential hypertension    4. Type 2 diabetes mellitus without complication, unspecified whether long term insulin use ([facility] [address]    5. Renal insufficiency        1. Coronary artery disease: Stable, no anginal symptoms  Cath 9/2009>  40-50% mid LAD lesion. Stress test 8/2010 showed normal perfusion and EF. GXT Myoview 7/2012> normal with hypertensive exercise response  EKG  7/23/19 (read & interpreted by me)> SR 61  ~ Denies chest pain   2. Other and unspecified hyperlipidemia:  on Lipitor 20mg   6/19/20> , , HDL 33, LDL 76.  ~2/25/21 , HDL 39  LDL 86  . Continues to work on diet. 3. Essential hypertension, benign: controlled.  /62 (Site: Left Upper Arm, Position: Sitting, Cuff Size: Medium Adult)   Pulse 70   Resp 20   Ht 5' 9\" (1.753 m)   Wt 240 lb (108.9 kg)   SpO2 98%   BMI 35.44 kg/m²    ~Stable     4. Diabetes mellitus: Followed by PCP- uncontrolled  6/16/20> A1c 10. 6- recently started Trulicity   ~Now 6.9 on 2/25/21     5. Altered kidney function: 2/24.4/17>  Creat 1.9. 1/18/19> Creat 2.21.  8/8/19> 2.12  6/16/20 Creat 2.19   ~Improved to 1.8 on 2/25/21 likely due to improvements in diabetes. Recommended Follow up with Dr. Yaquelin Trimble- previously unable to follow up due to insurance issues. Plan:   Plan Stress echo in 6 month with next visit. Refills sent   Cardiac test and lab results personally reviewed by me during this office visit and discussed with patient Primo Martinim    No medication changes at this time. Continue risk factor modifications with heathy eating and moderate walking as exercise. Call for any change in symptoms such as chest pain or shortness of breath. I appreciate the opportunity of cooperating in the care of this individual.    Tammy Skinner. Andressa Palomo M.D., [facility]    Patient's problem list, medications, allergies, past medical, surgical, social and family histories were reviewed and updated as appropriate. Scribe's attestation: This note was scribed in the presence of Dr. Andressa Palomo MD, by Charlette Pisano RN     The scribe's documentation has been prepared under my direction and personally reviewed by me in its entirety. I confirm that the note above accurately reflects all work, treatment, procedures, and medical decision making performed by me.

## 2022-02-16 RX ORDER — IRBESARTAN AND HYDROCHLOROTHIAZIDE 300; 12.5 MG/1; MG/1
TABLET, FILM COATED ORAL
Qty: 90 TABLET | Refills: 3 | Status: SHIPPED | OUTPATIENT
Start: 2022-02-16

## 2022-02-16 RX ORDER — AMLODIPINE BESYLATE 5 MG/1
TABLET ORAL
Qty: 90 TABLET | Refills: 3 | Status: SHIPPED | OUTPATIENT
Start: 2022-02-16

## 2022-02-16 NOTE — TELEPHONE ENCOUNTER
Received refill request for Amlodipine, Avalide from [facility].     Last ov: 2021 LES    Last labs: 11/10/2021 (care everywhere)    Last EK2019    Last Refill: 2021 #90 w/ 3 refills    Next appointment: 2022 LES

## 2022-03-10 NOTE — PROGRESS NOTES
[facility]   Cardiac Follow Up    Referring Provider:  aKylin Butler DO     Chief Complaint   Patient presents with    Hypertension    Hyperlipidemia    Coronary Artery Disease    6 Month Follow-Up      History of Present Illness:  Mr. Rashida Hdz is a 77y.o. year old gentleman with a history of non-obstructive CAD (40-50% mid LAD lesion per cath 9/2009), hypertension, hyperlipidemia, and diabetes, THIERNO. He had bilateral carpel tunnel release on 11/17/2020. Today he is here with his wife. He reports doing well. Planning on going to [facility] soon for a horse show with their son. He recently started on levothyroxine for hypothyroidism by his pcp. He did have Covid in November '21. He had a sleep study and is being changed from cpap to a bipap. Past Medical History:   has a past medical history of Diabetes mellitus ([address] Hyperlipidemia, and Hypertension. Surgical History:   has a past surgical history that includes Carpal tunnel release (Bilateral, 11/2020). Social History:   reports that he has never smoked. He has never used smokeless tobacco. He reports current alcohol use. He reports that he does not use drugs. Family History:  family history includes Heart Disease in his father.         Current Outpatient Medications   Medication Sig Dispense Refill    levothyroxine (SYNTHROID) 25 MCG tablet TAKE ONE TABLET BY MOUTH DAILY      irbesartan-hydroCHLOROthiazide (AVALIDE) 300-12.5 MG per tablet TAKE ONE TABLET BY MOUTH EVERY DAY 90 tablet 3    amLODIPine (NORVASC) 5 MG tablet TAKE ONE TABLET BY MOUTH EVERY DAY 90 tablet 3    tadalafil (CIALIS) 5 MG tablet TAKE 1 TABLET BY MOUTH ONCE DAILY      atorvastatin (LIPITOR) 20 MG tablet Take 1 tablet by mouth nightly 90 tablet 3    nitroGLYCERIN (NITROSTAT) 0.4 MG SL tablet Place 1 tablet under the tongue every 5 minutes as needed for Chest pain As directed for chest pain 25 tablet 1    metoprolol succinate (TOPROL XL) 100 pleasant  Skin:good turgor,intact without lesions  HEENT: EOMI ,normal  Neck:no JVD   Respiratory:  · Normal excursion and expansion without use of accessory muscles  · Resp Auscultation: Normal breath sounds without dullness  Cardiovascular:  · The apical impulses not displaced  · Heart tones are crisp and normal  · Cervical veins are not engorged  · The carotid upstroke is normal in amplitude and contour without delay or bruit  · Normal heart tones with no rub, murmur or gallops  · Peripheral pulses are symmetrical and full  · There is no clubbing, cyanosis of the extremities. · No edema  · Femoral Arteries: 2+ and equal  · Pedal Pulses: 2+ and equal   Abdomen:  · No masses or tenderness  · Liver/Spleen: No Abnormalities Noted  Neurological/Psychiatric:  · Alert and oriented in all spheres  · Moves all extremities well  · Exhibits normal gait balance and coordination  · No abnormalities of mood, affect, memory, mentation, or behavior are noted    Stress echo 9/9/21        Echo 8/23/19   Normal left ventricle size and systolic function with an estimated ejection   fraction of 60%. No regional wall motion abnormalities are seen.   There is moderate concentric left ventricular hypertrophy.   E/e\"= 13. Grade I diastolic dysfunction with normal LV filling pressures. [address] not well visualized. Assessment:      1. Coronary artery disease: Stable, no anginal symptoms  Cath 9/2009>  40-50% mid LAD lesion. Stress test 8/2010 showed normal perfusion and EF. GXT Myoview 7/2012> normal with hypertensive exercise response  Stress ECHO 9/9/21 -  unremarkable  EKG  7/23/19 (read & interpreted by me)> SR 61  ~ Denies chest pain   2. Other and unspecified hyperlipidemia:  on Lipitor 20mg   6/19/20> , , HDL 33, LDL 76.  ~2/25/21 , HDL 39  LDL 86  . Continues to work on diet. - 3/2/22>   HDL 42 LDL 97   3. Essential hypertension, benign: controlled.  /62 (Site: Left Upper Arm, Position: Sitting, Cuff Size: Large Adult)   Pulse 67   Ht 5' 9\" (1.753 m)   Wt 237 lb 8 oz (107.7 kg)   SpO2 98%   BMI 35.07 kg/m²    ~Stable     4. Diabetes mellitus: Followed by PCP- uncontrolled  6/16/20> A1c 10. 6- recently started Trulicity   ~Now 6.9 on 2/25/21     5. Altered kidney function: 2/24.4/17>  Creat 1.9. 1/18/19> Creat 2.21.  8/8/19> 2.12  6/16/20 Creat 2.19  3/2/22> Creat 1.7    Follows with nephrology. Plan:   Bettye Kilpatrick has a stable cardiac status. Cardiac test and lab results personally reviewed by me during this office visit and discussed. No med changes. Continue risk factor modifications. Call for any change in symptoms. Return for regular follow up in 6 months. I appreciate the opportunity of cooperating in the care of this individual.    Sonia Alvarado. Bess Mcdonald M.D., [facility] [place]    Patient's problem list, medications, allergies, past medical, surgical, social and family histories were reviewed and updated as appropriate. Scribe's Attestation: This note was scribed in the presence of Jennifer Sanders M.D., [facility] [place] by Val Gutiérrez RN. The scribe's documentation has been prepared under my direction and personally reviewed by me in its entirety. I confirm that the note above accurately reflects all work, treatment, procedures, and medical decision making performed by me.

## 2022-03-11 ENCOUNTER — OFFICE VISIT (OUTPATIENT)
Dept: CARDIOLOGY CLINIC | Age: 67
End: 2022-03-11
Payer: MEDICARE

## 2022-03-11 VITALS
HEART RATE: 67 BPM | HEIGHT: 69 IN | SYSTOLIC BLOOD PRESSURE: 116 MMHG | BODY MASS INDEX: 35.18 KG/M2 | DIASTOLIC BLOOD PRESSURE: 62 MMHG | OXYGEN SATURATION: 98 % | WEIGHT: 237.5 LBS

## 2022-03-11 DIAGNOSIS — E78.5 HYPERLIPIDEMIA, UNSPECIFIED HYPERLIPIDEMIA TYPE: ICD-10-CM

## 2022-03-11 DIAGNOSIS — I10 ESSENTIAL HYPERTENSION, BENIGN: ICD-10-CM

## 2022-03-11 DIAGNOSIS — I25.10 ATHEROSCLEROSIS OF NATIVE CORONARY ARTERY OF NATIVE HEART WITHOUT ANGINA PECTORIS: Primary | ICD-10-CM

## 2022-03-11 PROCEDURE — 93000 ELECTROCARDIOGRAM COMPLETE: CPT | Performed by: INTERNAL MEDICINE

## 2022-03-11 PROCEDURE — 99214 OFFICE O/P EST MOD 30 MIN: CPT | Performed by: INTERNAL MEDICINE

## 2022-03-11 RX ORDER — LEVOTHYROXINE SODIUM 0.03 MG/1
TABLET ORAL
COMMUNITY
Start: 2022-03-03

## 2022-08-22 NOTE — PROGRESS NOTES
[facility]   Cardiac Follow Up    Referring Provider:  Judge Thais DO     Chief Complaint   Patient presents with    Hypertension    Coronary Artery Disease    Hyperlipidemia    6 Month Follow-Up        History of Present Illness:  Mr. Bereket Gar is a 79y.o. year old gentleman with a history of non-obstructive CAD (40-50% mid LAD lesion per cath 9/2009), hypertension, hyperlipidemia, and diabetes, THIERNO. He had bilateral carpel tunnel release on 11/17/2020. He did have Covid in November '21. He had a sleep study and is being changed from cpap to a bipap. Today he is here for 6 mos follow up. Pt denies exertional chest pain, STEWARD/PND, palpitations, light-headedness, edema. He states pulmonologist recently changed him from cpap to bipap due to sleep study results. He states he feels better with the bipap machine. He is active with horse shows around the country    Past Medical History:   has a past medical history of Diabetes mellitus ([address] Hyperlipidemia, and Hypertension. Surgical History:   has a past surgical history that includes Carpal tunnel release (Bilateral, 11/2020). Social History:   reports that he has never smoked. He has never used smokeless tobacco. He reports current alcohol use. He reports that he does not use drugs. Family History:  family history includes Heart Disease in his father.         Current Outpatient Medications   Medication Sig Dispense Refill    levothyroxine (SYNTHROID) 25 MCG tablet TAKE ONE TABLET BY MOUTH DAILY      irbesartan-hydroCHLOROthiazide (AVALIDE) 300-12.5 MG per tablet TAKE ONE TABLET BY MOUTH EVERY DAY 90 tablet 3    amLODIPine (NORVASC) 5 MG tablet TAKE ONE TABLET BY MOUTH EVERY DAY 90 tablet 3    tadalafil (CIALIS) 5 MG tablet TAKE 1 TABLET BY MOUTH ONCE DAILY      atorvastatin (LIPITOR) 20 MG tablet Take 1 tablet by mouth nightly 90 tablet 3    nitroGLYCERIN (NITROSTAT) 0.4 MG SL tablet Place 1 tablet under the tongue every 5 minutes as needed for Chest pain As directed for chest pain 25 tablet 1    metoprolol succinate (TOPROL XL) 100 MG extended release tablet Take 1 tablet by mouth daily 90 tablet 3    TRULICITY 1.79 ZO/1.1NV SOPN INJECT 1 DOSE SUBCUTANEOUSLY ONCE A WEEK      loratadine (CLARITIN) 10 MG capsule Take 10 mg by mouth daily      empagliflozin (JARDIANCE) 25 MG tablet Take 25 mg by mouth daily      metformin (GLUCOPHAGE) 500 MG tablet Take 1 tablet by mouth 3 times daily. 90 tablet 6    glyBURIDE (DIABETA) 5 MG tablet Take 5 mg by mouth 2 times daily. ASA-APAP-Caff-Ca Gluc TABS Take 81 mg by mouth daily. No current facility-administered medications for this visit. Allergies:  Fish oil     Review of Systems:   Constitutional: there has been no unanticipated weight loss. There's been no change in energy level, sleep pattern, or activity level. Eyes: No visual changes or diplopia. No scleral icterus. ENT: No Headaches, hearing loss or vertigo. No mouth sores or sore throat. Cardiovascular: Reviewed in HPI  Respiratory: No cough or wheezing, no sputum production. No hematemesis. Gastrointestinal: No abdominal pain, appetite loss, blood in stools. No change in bowel or bladder habits. Genitourinary: No dysuria, trouble voiding, or hematuria. Musculoskeletal:  No gait disturbance, weakness or joint complaints. Integumentary: No rash or pruritis. Neurological: No headache, diplopia, change in muscle strength, numbness or tingling. No change in gait, balance, coordination, mood, affect, memory, mentation, behavior. Psychiatric: No anxiety, no depression. Endocrine: No malaise, fatigue or temperature intolerance. No excessive thirst, fluid intake, or urination. No tremor. Hematologic/Lymphatic: No abnormal bruising or bleeding, blood clots or swollen lymph nodes. Allergic/Immunologic: No nasal congestion or hives.     Physical Examination:    Vitals:    09/06/22 0827   BP: 120/68   Pulse: 62 SpO2: 99%          Constitutional and General Appearance: NAD, obese, pleasant  Skin:good turgor,intact without lesions  HEENT: EOMI ,normal  Neck:no JVD   Respiratory:  Normal excursion and expansion without use of accessory muscles  Resp Auscultation: Normal breath sounds without dullness  Cardiovascular: The apical impulses not displaced  Heart tones are crisp and normal  Cervical veins are not engorged  The carotid upstroke is normal in amplitude and contour without delay or bruit  Normal heart tones with no rub, murmur or gallops  Peripheral pulses are symmetrical and full  There is no clubbing, cyanosis of the extremities. No edema  Femoral Arteries: 2+ and equal  Pedal Pulses: 2+ and equal   Abdomen:  No masses or tenderness  Liver/Spleen: No Abnormalities Noted  Neurological/Psychiatric:  Alert and oriented in all spheres  Moves all extremities well  Exhibits normal gait balance and coordination  No abnormalities of mood, affect, memory, mentation, or behavior are noted    Stress echo 9/9/21        Echo 8/23/19   Normal left ventricle size and systolic function with an estimated ejection   fraction of 60%. No regional wall motion abnormalities are seen. There is moderate concentric left ventricular hypertrophy. E/e\"= 13. Grade I diastolic dysfunction with normal LV filling pressures. IVC not well visualized. Assessment:      1. Coronary artery disease:   Stable  denies anginal symptoms   Cath 9/2009>  40-50% mid LAD lesion. Stress test 8/2010 showed normal perfusion and EF. GXT Myoview 7/2012> normal with hypertensive exercise response  Stress ECHO 9/9/21 -  unremarkable  EKG  7/23/19 (read & interpreted by me)> SR 61     2. Other and unspecified hyperlipidemia:    Continue on Lipitor 20mg   6/19/20> , , HDL 33, LDL 76.  ~2/25/21 , HDL 39  LDL 86  . Continues to work on diet. - 3/2/22>   HDL 42 LDL 97     3.  Essential hypertension, benign:   Stable   BP

## 2022-09-06 ENCOUNTER — OFFICE VISIT (OUTPATIENT)
Dept: CARDIOLOGY CLINIC | Age: 67
End: 2022-09-06
Payer: MEDICARE

## 2022-09-06 VITALS
BODY MASS INDEX: 35.09 KG/M2 | WEIGHT: 236.9 LBS | OXYGEN SATURATION: 99 % | HEART RATE: 62 BPM | SYSTOLIC BLOOD PRESSURE: 120 MMHG | DIASTOLIC BLOOD PRESSURE: 68 MMHG | HEIGHT: 69 IN

## 2022-09-06 DIAGNOSIS — N28.9 RENAL INSUFFICIENCY: ICD-10-CM

## 2022-09-06 DIAGNOSIS — I10 ESSENTIAL HYPERTENSION, BENIGN: ICD-10-CM

## 2022-09-06 DIAGNOSIS — I25.10 ATHEROSCLEROSIS OF NATIVE CORONARY ARTERY OF NATIVE HEART WITHOUT ANGINA PECTORIS: Primary | ICD-10-CM

## 2022-09-06 DIAGNOSIS — E78.5 HYPERLIPIDEMIA, UNSPECIFIED HYPERLIPIDEMIA TYPE: ICD-10-CM

## 2022-09-06 DIAGNOSIS — E11.9 TYPE 2 DIABETES MELLITUS WITHOUT COMPLICATION, UNSPECIFIED WHETHER LONG TERM INSULIN USE (HCC): ICD-10-CM

## 2022-09-06 PROCEDURE — 99214 OFFICE O/P EST MOD 30 MIN: CPT | Performed by: INTERNAL MEDICINE

## 2022-09-06 PROCEDURE — 1123F ACP DISCUSS/DSCN MKR DOCD: CPT | Performed by: INTERNAL MEDICINE

## 2022-09-06 NOTE — PATIENT INSTRUCTIONS
No medication changes  Obtain cholesterol levels before next office visit per pcp  Continue risk factor modifications. Call for any change in symptoms, call to report any changes in shortness of breath or development of chest pain with activity.     Follow up in 6 mos

## 2023-03-08 NOTE — PROGRESS NOTES
[facility]   Cardiac Follow Up    Referring Provider:  Laya Campos DO     Chief Complaint   Patient presents with    Hyperlipidemia    Hypertension    Coronary Artery Disease    6 Month Follow-Up          History of Present Illness:  Mr. Caleb Dyer is a 79y.o. year old gentleman with a history of non-obstructive CAD (40-50% mid LAD lesion per cath 9/2009), hypertension, hyperlipidemia, and diabetes, THIERNO. He had bilateral carpel tunnel release on 11/17/2020. He did have Covid in November '21. He had a sleep study and is being changed from cpap to a bipap. He is active with horse shows around the country    Today he is here for 6 mos follow up. He is with his wife. He reports he lost 10 pounds over last 6-8 mos. He continues to be active, despite senior living. 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 Denies any new health issues. Pt denies exertional chest pain, STEWARD/PND, palpitations, light-headedness, edema. He is going to go to [facility] for a horse show in a few months. Past Medical History:   has a past medical history of Diabetes mellitus ([address] Hyperlipidemia, and Hypertension. Surgical History:   has a past surgical history that includes Carpal tunnel release (Bilateral, 11/2020). Social History:   reports that he has never smoked. He has never used smokeless tobacco. He reports current alcohol use. He reports that he does not use drugs. Family History:  family history includes Heart Disease in his father.         Current Outpatient Medications   Medication Sig Dispense Refill    levothyroxine (SYNTHROID) 25 MCG tablet TAKE ONE TABLET BY MOUTH DAILY      irbesartan-hydroCHLOROthiazide (AVALIDE) 300-12.5 MG per tablet TAKE ONE TABLET BY MOUTH EVERY DAY 90 tablet 3    amLODIPine (NORVASC) 5 MG tablet TAKE ONE TABLET BY MOUTH EVERY DAY 90 tablet 3    tadalafil (CIALIS) 5 MG tablet daily      atorvastatin (LIPITOR) 20 MG tablet Take 1 tablet by mouth nightly 90 tablet 3    nitroGLYCERIN (NITROSTAT) 0.4 MG SL tablet Place 1 tablet under the tongue every 5 minutes as needed for Chest pain As directed for chest pain 25 tablet 1    metoprolol succinate (TOPROL XL) 100 MG extended release tablet Take 1 tablet by mouth daily 90 tablet 3    TRULICITY 6.91 RG/4.2NG SOPN INJECT 1 DOSE SUBCUTANEOUSLY ONCE A WEEK      loratadine (CLARITIN) 10 MG capsule Take 10 mg by mouth daily      empagliflozin (JARDIANCE) 25 MG tablet Take 25 mg by mouth daily      metformin (GLUCOPHAGE) 500 MG tablet Take 1 tablet by mouth 3 times daily. 90 tablet 6    glyBURIDE (DIABETA) 5 MG tablet Take 5 mg by mouth 2 times daily. ASA-APAP-Caff-Ca Gluc TABS Take 81 mg by mouth daily. No current facility-administered medications for this visit. Allergies:  Fish oil     Review of Systems:   Constitutional: there has been no unanticipated weight loss. There's been no change in energy level, sleep pattern, or activity level. Eyes: No visual changes or diplopia. No scleral icterus. ENT: No Headaches, hearing loss or vertigo. No mouth sores or sore throat. Cardiovascular: Reviewed in HPI  Respiratory: No cough or wheezing, no sputum production. No hematemesis. Gastrointestinal: No abdominal pain, appetite loss, blood in stools. No change in bowel or bladder habits. Genitourinary: No dysuria, trouble voiding, or hematuria. Musculoskeletal:  No gait disturbance, weakness or joint complaints. Integumentary: No rash or pruritis. Neurological: No headache, diplopia, change in muscle strength, numbness or tingling. No change in gait, balance, coordination, mood, affect, memory, mentation, behavior. Psychiatric: No anxiety, no depression. Endocrine: No malaise, fatigue or temperature intolerance. No excessive thirst, fluid intake, or urination. No tremor. Hematologic/Lymphatic: No abnormal bruising or bleeding, blood clots or swollen lymph nodes. Allergic/Immunologic: No nasal congestion or hives.     Physical Examination:    Vitals:    03/16/23 0956   BP: 114/68   Pulse: 63   SpO2: 98%            Constitutional and General Appearance: NAD, obese, pleasant  Skin:good turgor,intact without lesions  HEENT: EOMI ,normal  Neck:no JVD   Respiratory:  Normal excursion and expansion without use of accessory muscles  Resp Auscultation: Normal breath sounds without dullness  Cardiovascular: The apical impulses not displaced  Heart tones are crisp and normal  Cervical veins are not engorged  The carotid upstroke is normal in amplitude and contour without delay or bruit  Normal heart tones with no rub, murmur or gallops  Peripheral pulses are symmetrical and full  There is no clubbing, cyanosis of the extremities. No edema  Femoral Arteries: 2+ and equal  Pedal Pulses: 2+ and equal   Abdomen:  No masses or tenderness  Liver/Spleen: No Abnormalities Noted  Neurological/Psychiatric:  Alert and oriented in all spheres  Moves all extremities well  Exhibits normal gait balance and coordination  No abnormalities of mood, affect, memory, mentation, or behavior are noted    Stress echo 9/9/21        Echo 8/23/19   Normal left ventricle size and systolic function with an estimated ejection   fraction of 60%. No regional wall motion abnormalities are seen. There is moderate concentric left ventricular hypertrophy. E/e\"= 13. Grade I diastolic dysfunction with normal LV filling pressures. IVC not well visualized. Assessment:      1. Coronary artery disease:   Stable  denies anginal symptoms   Cath 9/2009>  40-50% mid LAD lesion. Stress test 8/2010 showed normal perfusion and EF. GXT Myoview 7/2012> normal with hypertensive exercise response  Stress ECHO 9/9/21 -  unremarkable  EKG  7/23/19 (read & interpreted by me)> SR 61  Continue asa/statin/bb     2. Other and unspecified hyperlipidemia:    Continue on Lipitor 20mg   6/19/20> , , HDL 33, LDL 76.  ~2/25/21 , HDL 39  LDL 86  . Continues to work on diet. -3/2/22>   HDL 42 LDL 97  -3/14/23 >   HDL 37 LDL 84     3. Essential hypertension, benign:   Stable   /68 (Site: Left Upper Arm, Position: Sitting, Cuff Size: Medium Adult)   Pulse 63   Ht 5' 9\" (1.753 m)   Wt 232 lb 8 oz (105.5 kg)   SpO2 98%   BMI 34.33 kg/m²    Remain on current medication regimen     4. Diabetes mellitus: Followed by PCP- uncontrolled  6/16/20> A1c 10. 6- recently started Trulicity    I8E 6.9 on 2/25/21   A1C 6.5 on 3/2/22   A1C 6.3 on 3/14/23     5. Altered kidney function:  1/18/19> Creat 2.21.  8/8/19> 2.12  6/16/20 Creat 2.19  3/2/22> Creat 1.7 3/14/23 > creat 1.91    Follows with nephrology. Plan:   Cardiac test and lab results personally reviewed by me during this office visit and discussed. No medication changes  Continue risk factor modifications. Call for any change in symptoms, call to report any changes in shortness of breath or development of chest pain with activity. Follow up in 6 mos          I appreciate the opportunity of cooperating in the care of this individual.    Blayne Deshpande. Radha Adams M.D., [facility] [place]    Patient's problem list, medications, allergies, past medical, surgical, social and family histories were reviewed and updated as appropriate. Scribe's attestation: This note was scribed in the presence of Dr Radha Adams by Kitty Dunham RN. The scribe's documentation has been prepared under my direction and personally reviewed by me in its entirety. I confirm that the note above accurately reflects all work, treatment, procedures, and medical decision making performed by me.

## 2023-03-16 ENCOUNTER — OFFICE VISIT (OUTPATIENT)
Dept: CARDIOLOGY CLINIC | Age: 68
End: 2023-03-16
Payer: MEDICARE

## 2023-03-16 VITALS
HEIGHT: 69 IN | BODY MASS INDEX: 34.44 KG/M2 | SYSTOLIC BLOOD PRESSURE: 114 MMHG | HEART RATE: 63 BPM | OXYGEN SATURATION: 98 % | DIASTOLIC BLOOD PRESSURE: 68 MMHG | WEIGHT: 232.5 LBS

## 2023-03-16 DIAGNOSIS — E78.5 HYPERLIPIDEMIA, UNSPECIFIED HYPERLIPIDEMIA TYPE: ICD-10-CM

## 2023-03-16 DIAGNOSIS — N28.9 RENAL INSUFFICIENCY: ICD-10-CM

## 2023-03-16 DIAGNOSIS — I25.10 CORONARY ARTERY DISEASE INVOLVING NATIVE CORONARY ARTERY OF NATIVE HEART WITHOUT ANGINA PECTORIS: Primary | ICD-10-CM

## 2023-03-16 DIAGNOSIS — I10 ESSENTIAL HYPERTENSION, BENIGN: ICD-10-CM

## 2023-03-16 DIAGNOSIS — E11.9 TYPE 2 DIABETES MELLITUS WITHOUT COMPLICATION, UNSPECIFIED WHETHER LONG TERM INSULIN USE (HCC): ICD-10-CM

## 2023-03-16 PROCEDURE — 3078F DIAST BP <80 MM HG: CPT | Performed by: INTERNAL MEDICINE

## 2023-03-16 PROCEDURE — 99214 OFFICE O/P EST MOD 30 MIN: CPT | Performed by: INTERNAL MEDICINE

## 2023-03-16 PROCEDURE — 3074F SYST BP LT 130 MM HG: CPT | Performed by: INTERNAL MEDICINE

## 2023-03-16 PROCEDURE — 1123F ACP DISCUSS/DSCN MKR DOCD: CPT | Performed by: INTERNAL MEDICINE

## 2023-03-16 RX ORDER — NITROGLYCERIN 0.4 MG/1
0.4 TABLET SUBLINGUAL EVERY 5 MIN PRN
Qty: 25 TABLET | Refills: 1 | Status: SHIPPED | OUTPATIENT
Start: 2023-03-16

## 2023-03-16 RX ORDER — METOPROLOL SUCCINATE 100 MG/1
100 TABLET, EXTENDED RELEASE ORAL DAILY
Qty: 90 TABLET | Refills: 3 | Status: SHIPPED | OUTPATIENT
Start: 2023-03-16

## 2023-03-16 RX ORDER — ATORVASTATIN CALCIUM 20 MG/1
20 TABLET, FILM COATED ORAL NIGHTLY
Qty: 90 TABLET | Refills: 3 | Status: SHIPPED | OUTPATIENT
Start: 2023-03-16

## 2023-03-16 RX ORDER — AMLODIPINE BESYLATE 5 MG/1
TABLET ORAL
Qty: 90 TABLET | Refills: 3 | Status: SHIPPED | OUTPATIENT
Start: 2023-03-16

## 2023-09-05 NOTE — PROGRESS NOTES
[facility]   Cardiac Follow Up    Referring Provider:  Romaine Rachel DO     Chief Complaint   Patient presents with    Coronary Artery Disease    Hypertension    Hyperlipidemia    6 Month Follow-Up      History of Present Illness:  Mr. Brinton Heimlich is a 76y.o. year old gentleman with a history of non-obstructive CAD (40-50% mid LAD lesion per cath 9/2009), hypertension, hyperlipidemia, and diabetes, THIERNO. He had bilateral carpel tunnel release on 11/17/2020. He did have Covid in November '21. He had a sleep study and is being changed from cpap to a bipap. He is active with horse shows around the country    Today he is here for 6 mos follow up. He is with his wife. He is getting ready to go to [facility] for 3 weeks for a horse show, quarter horse congress. His son, Rafi Abernathy, is a professional . Pt denies exertional chest pain, STEWARD/PND, palpitations, light-headedness, edema. He states his weight typically fluctuates some. Past Medical History:   has a past medical history of Diabetes mellitus ([address]), Hyperlipidemia, and Hypertension. Surgical History:   has a past surgical history that includes Carpal tunnel release (Bilateral, 11/2020). Social History:   reports that he has never smoked. He has never used smokeless tobacco. He reports current alcohol use. He reports that he does not use drugs. Family History:  family history includes Heart Disease in his father.         Current Outpatient Medications   Medication Sig Dispense Refill    nitroGLYCERIN (NITROSTAT) 0.4 MG SL tablet Place 1 tablet under the tongue every 5 minutes as needed for Chest pain As directed for chest pain 25 tablet 1    amLODIPine (NORVASC) 5 MG tablet TAKE ONE TABLET BY MOUTH EVERY DAY 90 tablet 3    atorvastatin (LIPITOR) 20 MG tablet Take 1 tablet by mouth nightly 90 tablet 3    metoprolol succinate (TOPROL XL) 100 MG extended release tablet Take 1 tablet by mouth daily 90 tablet 3    levothyroxine

## 2023-09-15 ENCOUNTER — OFFICE VISIT (OUTPATIENT)
Dept: CARDIOLOGY CLINIC | Age: 68
End: 2023-09-15
Payer: MEDICARE

## 2023-09-15 VITALS
SYSTOLIC BLOOD PRESSURE: 118 MMHG | HEIGHT: 69 IN | BODY MASS INDEX: 35.59 KG/M2 | HEART RATE: 66 BPM | OXYGEN SATURATION: 97 % | WEIGHT: 240.3 LBS | DIASTOLIC BLOOD PRESSURE: 62 MMHG

## 2023-09-15 DIAGNOSIS — E78.5 HYPERLIPIDEMIA, UNSPECIFIED HYPERLIPIDEMIA TYPE: ICD-10-CM

## 2023-09-15 DIAGNOSIS — E11.9 TYPE 2 DIABETES MELLITUS WITHOUT COMPLICATION, UNSPECIFIED WHETHER LONG TERM INSULIN USE (HCC): ICD-10-CM

## 2023-09-15 DIAGNOSIS — N28.9 RENAL INSUFFICIENCY: ICD-10-CM

## 2023-09-15 DIAGNOSIS — I10 ESSENTIAL HYPERTENSION, BENIGN: ICD-10-CM

## 2023-09-15 DIAGNOSIS — I25.10 CORONARY ARTERY DISEASE INVOLVING NATIVE CORONARY ARTERY OF NATIVE HEART WITHOUT ANGINA PECTORIS: Primary | ICD-10-CM

## 2023-09-15 PROCEDURE — 3078F DIAST BP <80 MM HG: CPT | Performed by: INTERNAL MEDICINE

## 2023-09-15 PROCEDURE — 1123F ACP DISCUSS/DSCN MKR DOCD: CPT | Performed by: INTERNAL MEDICINE

## 2023-09-15 PROCEDURE — 3074F SYST BP LT 130 MM HG: CPT | Performed by: INTERNAL MEDICINE

## 2023-09-15 PROCEDURE — 99214 OFFICE O/P EST MOD 30 MIN: CPT | Performed by: INTERNAL MEDICINE

## 2023-09-15 NOTE — PATIENT INSTRUCTIONS
No medication changes  Continue risk factor modifications. Call for any change in symptoms, call to report any changes in shortness of breath or development of chest pain with activity.     Follow up in 6 mos with stress echo

## 2023-12-20 ENCOUNTER — TELEPHONE (OUTPATIENT)
Dept: CARDIOLOGY CLINIC | Age: 68
End: 2023-12-20

## 2023-12-20 NOTE — TELEPHONE ENCOUNTER
I spoke with wife and relayed message per ADRIANA. She stated that she has read that there is a biopsy taken at the site to check for the protein that is the indicator.  She stated that her  has had recurrent carpal tunnel is by no means a medical professional but thinks that he may need further testing

## 2023-12-20 NOTE — TELEPHONE ENCOUNTER
Wife is wanting to speak to LES about \"transthyretin amyloidosis\" and if it is something pt possibly has. Asking what direction he might go to find out. Please call wife Jerman Bower to discuss.

## 2024-08-06 NOTE — PROGRESS NOTES
nodes.  Allergic/Immunologic: No nasal congestion or hives.    Physical Examination:    Vitals:    08/08/24 1003   BP: (!) 122/50   Pulse: 65   SpO2: 98%                Constitutional and General Appearance: NAD, obese, pleasant  Skin:good turgor,intact without lesions  HEENT: EOMI ,normal  Neck:no JVD   Respiratory:  Normal excursion and expansion without use of accessory muscles  Resp Auscultation: Normal breath sounds without dullness  Cardiovascular:  The apical impulses not displaced  Heart tones are crisp and normal  Cervical veins are not engorged  The carotid upstroke is normal in amplitude and contour without delay or bruit  Normal heart tones with no rub, murmur or gallops  Peripheral pulses are symmetrical and full  There is no clubbing, cyanosis of the extremities.  No edema  Femoral Arteries: 2+ and equal  Pedal Pulses: 2+ and equal   Abdomen:  No masses or tenderness  Liver/Spleen: No Abnormalities Noted  Neurological/Psychiatric:  Alert and oriented in all spheres  Moves all extremities well  Exhibits normal gait balance and coordination  No abnormalities of mood, affect, memory, mentation, or behavior are noted    Stress Echo: Today (8/8/24) - Personally interpreted by me  Indication: Evaluation of heart structure/function  Summary:   See report    Stress echo 9/9/21  Normal stress ECHO.  Decreased sensitivity, failure to reach target heart rate 71% of predicted HR.Test done on beta blockade  There was stress induced fatigue. Resting pulse ox 99%. Unable to measure a peak pulse ox during exercise. No chest pain. SOB 5  out of 10.      Echo 8/23/19   Normal left ventricle size and systolic function with an estimated ejection   fraction of 60%. No regional wall motion abnormalities are seen.   There is moderate concentric left ventricular hypertrophy.   E/e\"= 13. Grade I diastolic dysfunction with normal LV filling pressures.   IVC not well visualized.    Assessment:      1. Coronary artery disease:

## 2024-08-08 ENCOUNTER — OFFICE VISIT (OUTPATIENT)
Dept: CARDIOLOGY CLINIC | Age: 69
End: 2024-08-08
Payer: MEDICARE

## 2024-08-08 VITALS
WEIGHT: 237.1 LBS | HEIGHT: 69 IN | BODY MASS INDEX: 35.12 KG/M2 | SYSTOLIC BLOOD PRESSURE: 122 MMHG | HEART RATE: 65 BPM | OXYGEN SATURATION: 98 % | DIASTOLIC BLOOD PRESSURE: 50 MMHG

## 2024-08-08 DIAGNOSIS — E11.9 TYPE 2 DIABETES MELLITUS WITHOUT COMPLICATION, UNSPECIFIED WHETHER LONG TERM INSULIN USE (HCC): ICD-10-CM

## 2024-08-08 DIAGNOSIS — I25.10 ATHEROSCLEROSIS OF NATIVE CORONARY ARTERY OF NATIVE HEART WITHOUT ANGINA PECTORIS: Primary | ICD-10-CM

## 2024-08-08 DIAGNOSIS — N28.9 RENAL INSUFFICIENCY: ICD-10-CM

## 2024-08-08 DIAGNOSIS — E78.49 OTHER HYPERLIPIDEMIA: ICD-10-CM

## 2024-08-08 DIAGNOSIS — I10 ESSENTIAL HYPERTENSION, BENIGN: ICD-10-CM

## 2024-08-08 PROCEDURE — 1123F ACP DISCUSS/DSCN MKR DOCD: CPT | Performed by: INTERNAL MEDICINE

## 2024-08-08 PROCEDURE — 3074F SYST BP LT 130 MM HG: CPT | Performed by: INTERNAL MEDICINE

## 2024-08-08 PROCEDURE — 99214 OFFICE O/P EST MOD 30 MIN: CPT | Performed by: INTERNAL MEDICINE

## 2024-08-08 PROCEDURE — 3078F DIAST BP <80 MM HG: CPT | Performed by: INTERNAL MEDICINE

## 2024-08-08 RX ORDER — ATORVASTATIN CALCIUM 20 MG/1
20 TABLET, FILM COATED ORAL NIGHTLY
Qty: 90 TABLET | Refills: 3 | Status: SHIPPED | OUTPATIENT
Start: 2024-08-08

## 2024-08-08 RX ORDER — METOPROLOL SUCCINATE 100 MG/1
100 TABLET, EXTENDED RELEASE ORAL DAILY
Qty: 90 TABLET | Refills: 3 | Status: SHIPPED | OUTPATIENT
Start: 2024-08-08

## 2024-08-08 RX ORDER — AMLODIPINE BESYLATE 5 MG/1
TABLET ORAL
Qty: 90 TABLET | Refills: 3 | Status: SHIPPED | OUTPATIENT
Start: 2024-08-08

## 2024-08-08 RX ORDER — IRBESARTAN AND HYDROCHLOROTHIAZIDE 300; 12.5 MG/1; MG/1
1 TABLET, FILM COATED ORAL DAILY
Qty: 90 TABLET | Refills: 3 | Status: SHIPPED | OUTPATIENT
Start: 2024-08-08

## 2024-08-08 NOTE — PATIENT INSTRUCTIONS
No medication changes    Labs soon - CBC, CMP, Lipid Panel - FASTING blood work    Follow up in 6 months

## 2025-02-21 NOTE — PROGRESS NOTES
[facility]   Cardiac Follow Up    Referring Provider:  Keon Connell DO     Chief Complaint   Patient presents with    Coronary Artery Disease    Hyperlipidemia    Hypertension      History of Present Illness:  Mr. Cliff Dhillon is a 69 y.o. year old gentleman with a history of non-obstructive CAD (40-50% mid LAD lesion per cath 9/2009), hypertension, hyperlipidemia, and diabetes, THIERNO. He had bilateral carpel tunnel release on 11/17/2020. He did have Covid in November '21. He had a sleep study and is being changed from cpap to a bipap. He is active with horse shows [place]    Today, Cliff is here for a 6 mos follow up. He is with his wife.  States he had an \"eye bleed,\" symptoms were blurred vision and floaters treated at [facility]. He states the bleed was caused by diabetes (Hgb A1c 7.2 1/29/25).  His wife is helping him with his diet.  In winter 2025, he went to [place] for a horse show.  He goes to [place] next week for horse show. 1/29/25 B 42 Cr 2.19, he had renal ultrasound > no obstruction of urinary flow.     Past Medical History:   has a past medical history of Diabetes mellitus (HCC), Hyperlipidemia, and Hypertension.    Surgical History:   has a past surgical history that includes Carpal tunnel release (Bilateral, 11/2020).     Social History:   reports that he has never smoked. He has never been exposed to tobacco smoke. He has never used smokeless tobacco. He reports current alcohol use. He reports that he does not use drugs.     Family History:  family history includes Heart Disease in his father.        Current Outpatient Medications   Medication Sig Dispense Refill    irbesartan-hydroCHLOROthiazide (AVALIDE) 300-12.5 MG per tablet Take 1 tablet by mouth daily 90 tablet 3    metoprolol succinate (TOPROL XL) 100 MG extended release tablet Take 1 tablet by mouth daily 90 tablet 3    atorvastatin (LIPITOR) 20 MG tablet Take 1 tablet by mouth nightly 90

## 2025-02-24 ENCOUNTER — OFFICE VISIT (OUTPATIENT)
Dept: CARDIOLOGY CLINIC | Age: 70
End: 2025-02-24
Payer: MEDICARE

## 2025-02-24 VITALS
HEART RATE: 61 BPM | HEIGHT: 69 IN | OXYGEN SATURATION: 99 % | SYSTOLIC BLOOD PRESSURE: 114 MMHG | BODY MASS INDEX: 34.07 KG/M2 | WEIGHT: 230 LBS | DIASTOLIC BLOOD PRESSURE: 58 MMHG

## 2025-02-24 DIAGNOSIS — E78.49 OTHER HYPERLIPIDEMIA: ICD-10-CM

## 2025-02-24 DIAGNOSIS — I10 ESSENTIAL HYPERTENSION, BENIGN: ICD-10-CM

## 2025-02-24 DIAGNOSIS — N28.9 RENAL INSUFFICIENCY: Primary | ICD-10-CM

## 2025-02-24 DIAGNOSIS — E11.9 TYPE 2 DIABETES MELLITUS WITHOUT COMPLICATION, UNSPECIFIED WHETHER LONG TERM INSULIN USE (HCC): ICD-10-CM

## 2025-02-24 DIAGNOSIS — I25.10 ATHEROSCLEROSIS OF NATIVE CORONARY ARTERY OF NATIVE HEART WITHOUT ANGINA PECTORIS: ICD-10-CM

## 2025-02-24 PROCEDURE — 1159F MED LIST DOCD IN RCRD: CPT | Performed by: INTERNAL MEDICINE

## 2025-02-24 PROCEDURE — 1123F ACP DISCUSS/DSCN MKR DOCD: CPT | Performed by: INTERNAL MEDICINE

## 2025-02-24 PROCEDURE — 3074F SYST BP LT 130 MM HG: CPT | Performed by: INTERNAL MEDICINE

## 2025-02-24 PROCEDURE — 99214 OFFICE O/P EST MOD 30 MIN: CPT | Performed by: INTERNAL MEDICINE

## 2025-02-24 PROCEDURE — 3078F DIAST BP <80 MM HG: CPT | Performed by: INTERNAL MEDICINE

## 2025-02-24 RX ORDER — NITROGLYCERIN 0.4 MG/1
0.4 TABLET SUBLINGUAL EVERY 5 MIN PRN
Qty: 25 TABLET | Refills: 1 | Status: SHIPPED | OUTPATIENT
Start: 2025-02-24

## 2025-02-24 NOTE — PATIENT INSTRUCTIONS
No medication changes  Renal profile in 6 mos   Continue risk factor modifications.   Call for any change in symptoms, call to report any changes in shortness of breath or development of chest pain with activity.    Follow up in 6 mos

## 2025-08-18 ENCOUNTER — OFFICE VISIT (OUTPATIENT)
Dept: CARDIOLOGY CLINIC | Age: 70
End: 2025-08-18
Payer: MEDICARE

## 2025-08-18 VITALS
BODY MASS INDEX: 33.97 KG/M2 | DIASTOLIC BLOOD PRESSURE: 64 MMHG | HEIGHT: 69 IN | SYSTOLIC BLOOD PRESSURE: 102 MMHG | OXYGEN SATURATION: 97 % | HEART RATE: 61 BPM

## 2025-08-18 DIAGNOSIS — I25.10 ATHEROSCLEROSIS OF NATIVE CORONARY ARTERY OF NATIVE HEART WITHOUT ANGINA PECTORIS: Primary | ICD-10-CM

## 2025-08-18 DIAGNOSIS — I10 ESSENTIAL HYPERTENSION, BENIGN: ICD-10-CM

## 2025-08-18 DIAGNOSIS — N28.9 RENAL INSUFFICIENCY: ICD-10-CM

## 2025-08-18 DIAGNOSIS — E78.49 OTHER HYPERLIPIDEMIA: ICD-10-CM

## 2025-08-18 DIAGNOSIS — E11.9 TYPE 2 DIABETES MELLITUS WITHOUT COMPLICATION, UNSPECIFIED WHETHER LONG TERM INSULIN USE (HCC): ICD-10-CM

## 2025-08-18 PROCEDURE — 1123F ACP DISCUSS/DSCN MKR DOCD: CPT | Performed by: INTERNAL MEDICINE

## 2025-08-18 PROCEDURE — 3078F DIAST BP <80 MM HG: CPT | Performed by: INTERNAL MEDICINE

## 2025-08-18 PROCEDURE — 99214 OFFICE O/P EST MOD 30 MIN: CPT | Performed by: INTERNAL MEDICINE

## 2025-08-18 PROCEDURE — 3074F SYST BP LT 130 MM HG: CPT | Performed by: INTERNAL MEDICINE

## 2025-08-18 PROCEDURE — 1159F MED LIST DOCD IN RCRD: CPT | Performed by: INTERNAL MEDICINE

## 2025-08-18 RX ORDER — METOPROLOL SUCCINATE 100 MG/1
100 TABLET, EXTENDED RELEASE ORAL DAILY
Qty: 90 TABLET | Refills: 3 | Status: SHIPPED | OUTPATIENT
Start: 2025-08-18

## 2025-08-18 RX ORDER — IRBESARTAN AND HYDROCHLOROTHIAZIDE 300; 12.5 MG/1; MG/1
1 TABLET, FILM COATED ORAL DAILY
Qty: 90 TABLET | Refills: 3 | Status: SHIPPED | OUTPATIENT
Start: 2025-08-18

## 2025-08-18 RX ORDER — AMLODIPINE BESYLATE 5 MG/1
TABLET ORAL
Qty: 90 TABLET | Refills: 3 | Status: SHIPPED | OUTPATIENT
Start: 2025-08-18

## 2025-08-18 RX ORDER — ATORVASTATIN CALCIUM 20 MG/1
20 TABLET, FILM COATED ORAL NIGHTLY
Qty: 90 TABLET | Refills: 3 | Status: SHIPPED | OUTPATIENT
Start: 2025-08-18